# Patient Record
Sex: MALE | Race: WHITE | NOT HISPANIC OR LATINO | URBAN - METROPOLITAN AREA
[De-identification: names, ages, dates, MRNs, and addresses within clinical notes are randomized per-mention and may not be internally consistent; named-entity substitution may affect disease eponyms.]

---

## 2019-02-26 ENCOUNTER — INPATIENT (INPATIENT)
Facility: HOSPITAL | Age: 79
LOS: 1 days | Discharge: HOME | DRG: 340 | End: 2019-02-28
Attending: SURGERY | Admitting: SURGERY
Payer: MEDICARE

## 2019-02-26 ENCOUNTER — TRANSCRIPTION ENCOUNTER (OUTPATIENT)
Age: 79
End: 2019-02-26

## 2019-02-26 VITALS — WEIGHT: 182.98 LBS | HEIGHT: 74 IN

## 2019-02-26 DIAGNOSIS — K35.30 ACUTE APPENDICITIS WITH LOCALIZED PERITONITIS, WITHOUT PERFORATION OR GANGRENE: ICD-10-CM

## 2019-02-26 DIAGNOSIS — E78.5 HYPERLIPIDEMIA, UNSPECIFIED: ICD-10-CM

## 2019-02-26 DIAGNOSIS — I10 ESSENTIAL (PRIMARY) HYPERTENSION: ICD-10-CM

## 2019-02-26 DIAGNOSIS — I71.4 ABDOMINAL AORTIC ANEURYSM, WITHOUT RUPTURE: ICD-10-CM

## 2019-02-26 LAB
ALBUMIN SERPL ELPH-MCNC: 3.3 G/DL — LOW (ref 3.5–5)
ALP SERPL-CCNC: 90 U/L — SIGNIFICANT CHANGE UP (ref 40–120)
ALT FLD-CCNC: 21 U/L DA — SIGNIFICANT CHANGE UP (ref 10–60)
ANION GAP SERPL CALC-SCNC: 8 MMOL/L — SIGNIFICANT CHANGE UP (ref 5–17)
APTT BLD: 37.5 SEC — HIGH (ref 27.5–36.3)
AST SERPL-CCNC: 23 U/L — SIGNIFICANT CHANGE UP (ref 10–40)
BASOPHILS # BLD AUTO: 0.03 K/UL — SIGNIFICANT CHANGE UP (ref 0–0.2)
BASOPHILS NFR BLD AUTO: 0.4 % — SIGNIFICANT CHANGE UP (ref 0–2)
BILIRUB SERPL-MCNC: 0.6 MG/DL — SIGNIFICANT CHANGE UP (ref 0.2–1.2)
BUN SERPL-MCNC: 20 MG/DL — HIGH (ref 7–18)
CALCIUM SERPL-MCNC: 8.9 MG/DL — SIGNIFICANT CHANGE UP (ref 8.4–10.5)
CHLORIDE SERPL-SCNC: 108 MMOL/L — SIGNIFICANT CHANGE UP (ref 96–108)
CK MB BLD-MCNC: 1 % — SIGNIFICANT CHANGE UP (ref 0–3.5)
CK MB CFR SERPL CALC: 1.3 NG/ML — SIGNIFICANT CHANGE UP (ref 0–3.6)
CK SERPL-CCNC: 129 U/L — SIGNIFICANT CHANGE UP (ref 35–232)
CO2 SERPL-SCNC: 23 MMOL/L — SIGNIFICANT CHANGE UP (ref 22–31)
CREAT SERPL-MCNC: 1.18 MG/DL — SIGNIFICANT CHANGE UP (ref 0.5–1.3)
EOSINOPHIL # BLD AUTO: 0.09 K/UL — SIGNIFICANT CHANGE UP (ref 0–0.5)
EOSINOPHIL NFR BLD AUTO: 1.2 % — SIGNIFICANT CHANGE UP (ref 0–6)
GLUCOSE SERPL-MCNC: 84 MG/DL — SIGNIFICANT CHANGE UP (ref 70–99)
HCT VFR BLD CALC: 43.5 % — SIGNIFICANT CHANGE UP (ref 39–50)
HGB BLD-MCNC: 14.3 G/DL — SIGNIFICANT CHANGE UP (ref 13–17)
IMM GRANULOCYTES NFR BLD AUTO: 0.3 % — SIGNIFICANT CHANGE UP (ref 0–1.5)
INR BLD: 1.06 RATIO — SIGNIFICANT CHANGE UP (ref 0.88–1.16)
LYMPHOCYTES # BLD AUTO: 1.45 K/UL — SIGNIFICANT CHANGE UP (ref 1–3.3)
LYMPHOCYTES # BLD AUTO: 19.2 % — SIGNIFICANT CHANGE UP (ref 13–44)
MCHC RBC-ENTMCNC: 29.5 PG — SIGNIFICANT CHANGE UP (ref 27–34)
MCHC RBC-ENTMCNC: 32.9 GM/DL — SIGNIFICANT CHANGE UP (ref 32–36)
MCV RBC AUTO: 89.7 FL — SIGNIFICANT CHANGE UP (ref 80–100)
MONOCYTES # BLD AUTO: 0.73 K/UL — SIGNIFICANT CHANGE UP (ref 0–0.9)
MONOCYTES NFR BLD AUTO: 9.7 % — SIGNIFICANT CHANGE UP (ref 2–14)
NEUTROPHILS # BLD AUTO: 5.24 K/UL — SIGNIFICANT CHANGE UP (ref 1.8–7.4)
NEUTROPHILS NFR BLD AUTO: 69.2 % — SIGNIFICANT CHANGE UP (ref 43–77)
NRBC # BLD: 0 /100 WBCS — SIGNIFICANT CHANGE UP (ref 0–0)
PLATELET # BLD AUTO: 249 K/UL — SIGNIFICANT CHANGE UP (ref 150–400)
POTASSIUM SERPL-MCNC: 4.6 MMOL/L — SIGNIFICANT CHANGE UP (ref 3.5–5.3)
POTASSIUM SERPL-SCNC: 4.6 MMOL/L — SIGNIFICANT CHANGE UP (ref 3.5–5.3)
PROT SERPL-MCNC: 8.2 G/DL — SIGNIFICANT CHANGE UP (ref 6–8.3)
PROTHROM AB SERPL-ACNC: 11.8 SEC — SIGNIFICANT CHANGE UP (ref 10–12.9)
RBC # BLD: 4.85 M/UL — SIGNIFICANT CHANGE UP (ref 4.2–5.8)
RBC # FLD: 12.4 % — SIGNIFICANT CHANGE UP (ref 10.3–14.5)
SODIUM SERPL-SCNC: 139 MMOL/L — SIGNIFICANT CHANGE UP (ref 135–145)
TROPONIN I SERPL-MCNC: <0.015 NG/ML — SIGNIFICANT CHANGE UP (ref 0–0.04)
WBC # BLD: 7.56 K/UL — SIGNIFICANT CHANGE UP (ref 3.8–10.5)
WBC # FLD AUTO: 7.56 K/UL — SIGNIFICANT CHANGE UP (ref 3.8–10.5)

## 2019-02-26 PROCEDURE — 99222 1ST HOSP IP/OBS MODERATE 55: CPT | Mod: 57

## 2019-02-26 PROCEDURE — 71045 X-RAY EXAM CHEST 1 VIEW: CPT | Mod: 26

## 2019-02-26 PROCEDURE — 99285 EMERGENCY DEPT VISIT HI MDM: CPT

## 2019-02-26 PROCEDURE — 99222 1ST HOSP IP/OBS MODERATE 55: CPT

## 2019-02-26 PROCEDURE — 93010 ELECTROCARDIOGRAM REPORT: CPT

## 2019-02-26 RX ORDER — CETIRIZINE HYDROCHLORIDE 10 MG/1
1 TABLET ORAL
Qty: 0 | Refills: 0 | COMMUNITY

## 2019-02-26 RX ORDER — LOSARTAN POTASSIUM 100 MG/1
0 TABLET, FILM COATED ORAL
Qty: 0 | Refills: 0 | COMMUNITY

## 2019-02-26 RX ORDER — CEFOTETAN DISODIUM 1 G
2 VIAL (EA) INJECTION EVERY 12 HOURS
Qty: 0 | Refills: 0 | Status: DISCONTINUED | OUTPATIENT
Start: 2019-02-27 | End: 2019-02-27

## 2019-02-26 RX ORDER — MORPHINE SULFATE 50 MG/1
2 CAPSULE, EXTENDED RELEASE ORAL EVERY 6 HOURS
Qty: 0 | Refills: 0 | Status: DISCONTINUED | OUTPATIENT
Start: 2019-02-26 | End: 2019-02-27

## 2019-02-26 RX ORDER — CEFOTETAN DISODIUM 1 G
VIAL (EA) INJECTION
Qty: 0 | Refills: 0 | Status: DISCONTINUED | OUTPATIENT
Start: 2019-02-26 | End: 2019-02-27

## 2019-02-26 RX ORDER — ONDANSETRON 8 MG/1
4 TABLET, FILM COATED ORAL EVERY 6 HOURS
Qty: 0 | Refills: 0 | Status: DISCONTINUED | OUTPATIENT
Start: 2019-02-26 | End: 2019-02-27

## 2019-02-26 RX ORDER — CEFOTETAN DISODIUM 1 G
2 VIAL (EA) INJECTION ONCE
Qty: 0 | Refills: 0 | Status: COMPLETED | OUTPATIENT
Start: 2019-02-26 | End: 2019-02-26

## 2019-02-26 RX ORDER — HEPARIN SODIUM 5000 [USP'U]/ML
5000 INJECTION INTRAVENOUS; SUBCUTANEOUS EVERY 8 HOURS
Qty: 0 | Refills: 0 | Status: DISCONTINUED | OUTPATIENT
Start: 2019-02-26 | End: 2019-02-27

## 2019-02-26 RX ORDER — SODIUM CHLORIDE 9 MG/ML
1000 INJECTION, SOLUTION INTRAVENOUS
Qty: 0 | Refills: 0 | Status: DISCONTINUED | OUTPATIENT
Start: 2019-02-26 | End: 2019-02-27

## 2019-02-26 RX ADMIN — MORPHINE SULFATE 2 MILLIGRAM(S): 50 CAPSULE, EXTENDED RELEASE ORAL at 18:33

## 2019-02-26 RX ADMIN — HEPARIN SODIUM 5000 UNIT(S): 5000 INJECTION INTRAVENOUS; SUBCUTANEOUS at 23:15

## 2019-02-26 RX ADMIN — MORPHINE SULFATE 2 MILLIGRAM(S): 50 CAPSULE, EXTENDED RELEASE ORAL at 18:05

## 2019-02-26 RX ADMIN — SODIUM CHLORIDE 120 MILLILITER(S): 9 INJECTION, SOLUTION INTRAVENOUS at 18:03

## 2019-02-26 RX ADMIN — ONDANSETRON 4 MILLIGRAM(S): 8 TABLET, FILM COATED ORAL at 18:05

## 2019-02-26 RX ADMIN — MORPHINE SULFATE 2 MILLIGRAM(S): 50 CAPSULE, EXTENDED RELEASE ORAL at 23:16

## 2019-02-26 RX ADMIN — Medication 100 GRAM(S): at 18:32

## 2019-02-26 RX ADMIN — MORPHINE SULFATE 2 MILLIGRAM(S): 50 CAPSULE, EXTENDED RELEASE ORAL at 23:54

## 2019-02-26 NOTE — ED PROVIDER NOTE - CARE PLAN
Principal Discharge DX:	Acute appendicitis with localized peritonitis, without perforation, abscess, or gangrene

## 2019-02-26 NOTE — CONSULT NOTE ADULT - ATTENDING COMMENTS
Seen ex'ed dw'ed PA  Incidental AIA  R LQ pain since fri (better)  No CP, Back pain, LE pain  No previous vasc probs  Active- walks 5 mi regularly.    HTN, Glaucoma, GERD  ASA, Statin  Remote smoking    No h/o GI neha, F/C    FH: Brother w cerebral aneurysm    AA, NAD, LEON's  No JVD/Icterus  Abd: R LQ mod tenderness  Ao nonpalp  Exts: wnl incl palp rad, fem, pop (wide), DP, PT's    OUtside pelvic CT w IV con reviewed: Ch appearing Aortiliac aneurysm.  Prox extent not vis.  Size still relatively small.    A/P:   Incidental AIA  Asymp  Active Appendicitis    Rec:   Procceed w Gen surg mgmt    WIll neeed CTA chest/ABd /Pelvis and poss plans for elective aneurysm repair    Screen direct relatives for aneurysms (DW'ed pt and daughter Rn)    Will fu Seen ex'ed dw'ed PA  Incidental AIA  R LQ pain since fri (better)  No CP, Back pain, LE pain  No previous vasc probs  Active- walks 5 mi regularly.    HTN, Glaucoma, GERD  ASA, Statin  Remote smoking    No h/o GI neha, F/C    FH: Brother w cerebral aneurysm    AA, NAD, LEON's  No JVD/Icterus  Abd: R LQ mod tenderness  Ao nonpalp  Exts: wnl incl palp rad, fem, pop (wide), DP, PT's    OUtside pelvic CT w IV con reviewed: Ch appearing Aortiliac aneurysm.  Prox extent not vis.  Size still relatively small.    A/P:   Incidental AIA  Asymp  Active Appendicitis    Rec:   Procceed w Gen surg mgmt    WIll neeed CTA chest/ABd /Pelvis and poss plans for elective aneurysm repair  Will also need art US of LEs R/O MIRNA's (can be done electively)    Screen direct relatives for aneurysms (DW'ed pt and daughter Rn)    Will fu

## 2019-02-26 NOTE — ED PROVIDER NOTE - CONSTITUTIONAL, MLM
normal... Very well appearing; younger than stated age well nourished, awake, alert, oriented to person, place, time/situation and in no apparent distress.

## 2019-02-26 NOTE — H&P ADULT - PROBLEM SELECTOR PLAN 2
1. Vascular Surgery consult  2. Strict BP control  3. Will continue ASA & statin post-op when he resumes oral regimen

## 2019-02-26 NOTE — H&P ADULT - RS GEN PE MLT RESP DETAILS PC
respirations non-labored/good air movement/breath sounds equal/clear to auscultation bilaterally/no rhonchi/no wheezes/no rales/no chest wall tenderness

## 2019-02-26 NOTE — CONSULT NOTE ADULT - ASSESSMENT
appendicitis planned for lap appy  4.9cm AAA dilated common iliac 3cm    no emergent vascular surgical intervention warranted presently. Pt to proceed with indicated General surgical procedure as needed.  Will need eventual CTA of chest/abd pelvis for full evaluation of Arterial aneurysmal disease.  BP control  observation

## 2019-02-26 NOTE — H&P ADULT - HISTORY OF PRESENT ILLNESS
A 79 yo male with a PMH of HTN and HLD presents to the ED with complaints of abdominal pain x5 days. Patient describes pain as sharp, constant, non-radiating, localized to the RLQ. Patient quantified pain as 12/10 five days ago and currently rates it as 5/10 in the ED without pain management. He states nothing makes it better or worse even while resting. Patient states he was referred by his GI physician for an abdominal CT due to his constant RLQ pain which revealed an acute, uncomplicated appendicitis & he was instructed to come to the ED. Outpatient images were uploaded into our system and reviewed by radiologist who also interpreted the CT as acute, uncomplicated appendicitis with possible phlegmonous changes to the appendiceal tip.    Also found on the CT was a 4.9cm descending aortic aneurysm with extension to the left common iliac artery with mural thrombus formation & a small questionable area of dissection.     Patient last ate yesterday.    Two weeks ago the patient had an EGD for dysphagia & was found to have esophagitis.

## 2019-02-26 NOTE — H&P ADULT - GIT ABD PE PAL DETAILS PC
+McBurney's sign, -Psoas sign, -Obturator sign, -Rovsing sign/rebound tenderness/splenomegaly/tender/guarding

## 2019-02-26 NOTE — H&P ADULT - ASSESSMENT
A 79 yo male with a PMH of HTN & HLD with Acute Uncomplicated Appendicitis &  4.9cm Descending Aortic Aneurysm

## 2019-02-26 NOTE — H&P ADULT - NSHPSOCIALHISTORY_GEN_ALL_CORE
Patient is  and a retired banker. Patient has a hx of smoking (quit 40 years ago but use to smoke 1/2 pack a day). Patient drinks occasionally about once a month and denies illicit drug use.

## 2019-02-26 NOTE — H&P ADULT - PROBLEM SELECTOR PLAN 1
1. Admit to Surgery (Dr. Tiardo)  2. NPO  3. Vitals per routine  4. Ambulation  5. LR @ 120cc/hr  6. Start Cefotetan 2g q 8 hrs  7. SQ Heparin for DVT PPx  8. Zofran as needed for nausea or vomiting  9. Morphine as needed for pain  10. AM Labs  11. Discussed with Dr. Tirado & he agrees 1. Admit to Surgery (Dr. Tirado)  2. NPO  3. Vitals per routine  4. Ambulation  5. LR @ 120cc/hr  6. Start Cefotetan 2g q 12 hrs  7. SQ Heparin for DVT PPx  8. Zofran as needed for nausea or vomiting  9. Morphine as needed for pain  10. AM Labs  11. Discussed with Dr. Tirado & he agrees

## 2019-02-26 NOTE — ED PROVIDER NOTE - OBJECTIVE STATEMENT
77 y/o M pt with a PMHx of HTN and HLD and no significant PSHx sent in my PMD for RLQ pain for the last couple of days. Pt notes accompanying nausea and subjective fever as well. Pt states that he was called back by his PMD for a CT read positive for appendicitis. Pt last meal and last ASA was yesterday. Pt afebrile in ED. Pt denies vomiting, diarrhea, or any other acute complaints. NKDA.

## 2019-02-26 NOTE — H&P ADULT - ATTENDING COMMENTS
pt with a/c appendicitis and also has aortic aneurysm. Had a long d/w the pt and the family  Needs laparoscopic appendectomy possible open  All the options, benefits and risks of the surgery were discussed  Family was also with him  Informed consent obtained

## 2019-02-26 NOTE — H&P ADULT - PROBLEM SELECTOR PLAN 4
1. Hold PO BP meds prior to surgery  2. If needed, will control BP with IV Enalapril  3. Resume oral regimen post-op

## 2019-02-26 NOTE — CONSULT NOTE ADULT - SUBJECTIVE AND OBJECTIVE BOX
79 yo male pmhx hTN, to er c/o abd pain localized to rlq since friday, dx with appendicitis planned for appendectemy, noted to have incidental finding of AAA on CT.  Pt states that no hx of Aneurysm prior.   No c/o lower extremity pain, or claudication. Non smoker. no cp or sob.  No back pain,      ICU Vital Signs Last 24 Hrs  T(C): 37.2 (26 Feb 2019 15:13), Max: 37.2 (26 Feb 2019 15:13)  T(F): 99 (26 Feb 2019 15:13), Max: 99 (26 Feb 2019 15:13)  HR: 79 (26 Feb 2019 15:13) (75 - 79)  BP: 135/79 (26 Feb 2019 15:13) (135/79 - 144/80)  BP(mean): --  ABP: --  ABP(mean): --  RR: 16 (26 Feb 2019 15:13) (16 - 16)  SpO2: 98% (26 Feb 2019 15:13) (97% - 98%)    A&Ox3 nad  Abd: soft =rlq tenderness at mcburdies point.  no palpable masses, no thrill. no bruits  Extremities warm bilat  fem pulses 2+ bilat  distal extremity 2+ bilat  lungs; cta bilat no wrr                            14.3   7.56  )-----------( 249      ( 26 Feb 2019 16:00 )             43.5     02-26    139  |  108  |  20<H>  ----------------------------<  84  4.6   |  23  |  1.18    Ca    8.9      26 Feb 2019 16:00    TPro  8.2  /  Alb  3.3<L>  /  TBili  0.6  /  DBili  x   /  AST  23  /  ALT  21  /  AlkPhos  90  02-26      CT (image loaded to pacs) reviewed with Dr calzada. 4.9cm infrarenal aaa extending to left iliac with a mural thrombus

## 2019-02-27 ENCOUNTER — RESULT REVIEW (OUTPATIENT)
Age: 79
End: 2019-02-27

## 2019-02-27 LAB
ABO RH CONFIRMATION: SIGNIFICANT CHANGE UP
ANION GAP SERPL CALC-SCNC: 6 MMOL/L — SIGNIFICANT CHANGE UP (ref 5–17)
BUN SERPL-MCNC: 21 MG/DL — HIGH (ref 7–18)
CALCIUM SERPL-MCNC: 8.6 MG/DL — SIGNIFICANT CHANGE UP (ref 8.4–10.5)
CHLORIDE SERPL-SCNC: 109 MMOL/L — HIGH (ref 96–108)
CO2 SERPL-SCNC: 24 MMOL/L — SIGNIFICANT CHANGE UP (ref 22–31)
CREAT SERPL-MCNC: 1.38 MG/DL — HIGH (ref 0.5–1.3)
GLUCOSE SERPL-MCNC: 81 MG/DL — SIGNIFICANT CHANGE UP (ref 70–99)
HCT VFR BLD CALC: 37.6 % — LOW (ref 39–50)
HGB BLD-MCNC: 12.7 G/DL — LOW (ref 13–17)
MCHC RBC-ENTMCNC: 30.5 PG — SIGNIFICANT CHANGE UP (ref 27–34)
MCHC RBC-ENTMCNC: 33.8 GM/DL — SIGNIFICANT CHANGE UP (ref 32–36)
MCV RBC AUTO: 90.2 FL — SIGNIFICANT CHANGE UP (ref 80–100)
NRBC # BLD: 0 /100 WBCS — SIGNIFICANT CHANGE UP (ref 0–0)
PLATELET # BLD AUTO: 240 K/UL — SIGNIFICANT CHANGE UP (ref 150–400)
POTASSIUM SERPL-MCNC: 4.8 MMOL/L — SIGNIFICANT CHANGE UP (ref 3.5–5.3)
POTASSIUM SERPL-SCNC: 4.8 MMOL/L — SIGNIFICANT CHANGE UP (ref 3.5–5.3)
RBC # BLD: 4.17 M/UL — LOW (ref 4.2–5.8)
RBC # FLD: 12.5 % — SIGNIFICANT CHANGE UP (ref 10.3–14.5)
SODIUM SERPL-SCNC: 139 MMOL/L — SIGNIFICANT CHANGE UP (ref 135–145)
WBC # BLD: 7.47 K/UL — SIGNIFICANT CHANGE UP (ref 3.8–10.5)
WBC # FLD AUTO: 7.47 K/UL — SIGNIFICANT CHANGE UP (ref 3.8–10.5)

## 2019-02-27 PROCEDURE — 88304 TISSUE EXAM BY PATHOLOGIST: CPT | Mod: 26

## 2019-02-27 PROCEDURE — 44970 LAPAROSCOPY APPENDECTOMY: CPT

## 2019-02-27 RX ORDER — CEFOTETAN DISODIUM 1 G
2 VIAL (EA) INJECTION EVERY 12 HOURS
Qty: 0 | Refills: 0 | Status: DISCONTINUED | OUTPATIENT
Start: 2019-02-27 | End: 2019-02-28

## 2019-02-27 RX ORDER — SODIUM CHLORIDE 9 MG/ML
1000 INJECTION, SOLUTION INTRAVENOUS
Qty: 0 | Refills: 0 | Status: DISCONTINUED | OUTPATIENT
Start: 2019-02-27 | End: 2019-02-27

## 2019-02-27 RX ORDER — OXYCODONE AND ACETAMINOPHEN 5; 325 MG/1; MG/1
1 TABLET ORAL EVERY 4 HOURS
Qty: 0 | Refills: 0 | Status: DISCONTINUED | OUTPATIENT
Start: 2019-02-27 | End: 2019-02-28

## 2019-02-27 RX ORDER — HYDROMORPHONE HYDROCHLORIDE 2 MG/ML
0.5 INJECTION INTRAMUSCULAR; INTRAVENOUS; SUBCUTANEOUS
Qty: 0 | Refills: 0 | Status: DISCONTINUED | OUTPATIENT
Start: 2019-02-27 | End: 2019-02-27

## 2019-02-27 RX ADMIN — Medication 100 GRAM(S): at 05:15

## 2019-02-27 RX ADMIN — Medication 100 GRAM(S): at 17:40

## 2019-02-27 RX ADMIN — HEPARIN SODIUM 5000 UNIT(S): 5000 INJECTION INTRAVENOUS; SUBCUTANEOUS at 05:16

## 2019-02-27 RX ADMIN — SODIUM CHLORIDE 120 MILLILITER(S): 9 INJECTION, SOLUTION INTRAVENOUS at 05:16

## 2019-02-27 NOTE — BRIEF OPERATIVE NOTE - PROCEDURE
<<-----Click on this checkbox to enter Procedure Appendectomy, laparoscopic, age older than 10 years  02/27/2019    Active  SEFERINO

## 2019-02-27 NOTE — PATIENT PROFILE ADULT - DO YOU FEEL LIKE HURTING OTHERS?
Problem: Safety  Goal: Will remain free from injury  Outcome: PROGRESSING AS EXPECTED  Bed locked and in lowest position. Call light and personal belongings in reach. Hourly rounding.     Problem: Pain Management  Goal: Pain level will decrease to patient's comfort goal  Outcome: PROGRESSING AS EXPECTED  Pt denies need for PRN pain medication at this time. Will continue to monitor         no

## 2019-02-27 NOTE — PROGRESS NOTE ADULT - SUBJECTIVE AND OBJECTIVE BOX
Patient seen and examined at bedside. Resting comfortably in NAD. States pain is well controlled. Denies N/V. Had loose BM this morning. Hemodynamically stable and afebrile. Cleared by vascular surgery for appendectomy. On IV ABx.    Vital Signs Last 24 Hrs  T(C): 36.9 (27 Feb 2019 05:43), Max: 37.2 (26 Feb 2019 15:13)  T(F): 98.4 (27 Feb 2019 05:43), Max: 99 (26 Feb 2019 15:13)  HR: 83 (27 Feb 2019 05:43) (75 - 85)  BP: 127/71 (27 Feb 2019 05:43) (127/71 - 144/80)  BP(mean): --  RR: 16 (27 Feb 2019 05:43) (16 - 16)  SpO2: 97% (27 Feb 2019 05:43) (97% - 99%)                          12.7   7.47  )-----------( 240      ( 27 Feb 2019 10:10 )             37.6   02-27    139  |  109<H>  |  21<H>  ----------------------------<  81  4.8   |  24  |  1.38<H>    Ca    8.6      27 Feb 2019 10:10    TPro  8.2  /  Alb  3.3<L>  /  TBili  0.6  /  DBili  x   /  AST  23  /  ALT  21  /  AlkPhos  90  02-26    General: AAOx3, resting in bed, in NAD  Resp: Reg resp effort on room air  Abd: Soft, ND, mild TTP in RLQ, ro rigidity or guarding    Assessment:   78 M with acute appendicitis    Plan:  -NPO  -Lap Appy today  -Continue Abx  -Monitor vitals  -Pain control as needed

## 2019-02-27 NOTE — PROGRESS NOTE ADULT - SUBJECTIVE AND OBJECTIVE BOX
Post op Check    Patient seen and examined at bedside. Resting comfortably in bed, in NAD. States he ate dinner, and tolerated well, no N/V. Denies flatus/BM yet. Hemodynamically stable, afebrile. Drain with minimal SS output. Pain well controlled. Remains on IV ABx    General: AAOx3, NAD  Resp: Reg resp effort on room air  Abd: Soft, ND, mild appropriate TTP around incision sites    Assessment:  78 M POD 0 from lap appendectomy    Plan:  -Monitor vitals  -Continue ABx  -Continue diet  -Heparin prophylaxis tomorrow  -Pain control as needed

## 2019-02-28 ENCOUNTER — TRANSCRIPTION ENCOUNTER (OUTPATIENT)
Age: 79
End: 2019-02-28

## 2019-02-28 VITALS
DIASTOLIC BLOOD PRESSURE: 88 MMHG | SYSTOLIC BLOOD PRESSURE: 170 MMHG | OXYGEN SATURATION: 100 % | HEART RATE: 84 BPM | RESPIRATION RATE: 17 BRPM | TEMPERATURE: 98 F

## 2019-02-28 LAB
ANION GAP SERPL CALC-SCNC: 10 MMOL/L — SIGNIFICANT CHANGE UP (ref 5–17)
BUN SERPL-MCNC: 23 MG/DL — HIGH (ref 7–18)
CALCIUM SERPL-MCNC: 8.3 MG/DL — LOW (ref 8.4–10.5)
CHLORIDE SERPL-SCNC: 107 MMOL/L — SIGNIFICANT CHANGE UP (ref 96–108)
CO2 SERPL-SCNC: 22 MMOL/L — SIGNIFICANT CHANGE UP (ref 22–31)
CREAT SERPL-MCNC: 1.37 MG/DL — HIGH (ref 0.5–1.3)
GLUCOSE SERPL-MCNC: 117 MG/DL — HIGH (ref 70–99)
GRAM STN FLD: SIGNIFICANT CHANGE UP
HCT VFR BLD CALC: 38 % — LOW (ref 39–50)
HGB BLD-MCNC: 12.5 G/DL — LOW (ref 13–17)
MCHC RBC-ENTMCNC: 29.4 PG — SIGNIFICANT CHANGE UP (ref 27–34)
MCHC RBC-ENTMCNC: 32.9 GM/DL — SIGNIFICANT CHANGE UP (ref 32–36)
MCV RBC AUTO: 89.4 FL — SIGNIFICANT CHANGE UP (ref 80–100)
NRBC # BLD: 0 /100 WBCS — SIGNIFICANT CHANGE UP (ref 0–0)
PLATELET # BLD AUTO: 247 K/UL — SIGNIFICANT CHANGE UP (ref 150–400)
POTASSIUM SERPL-MCNC: 4.4 MMOL/L — SIGNIFICANT CHANGE UP (ref 3.5–5.3)
POTASSIUM SERPL-SCNC: 4.4 MMOL/L — SIGNIFICANT CHANGE UP (ref 3.5–5.3)
RBC # BLD: 4.25 M/UL — SIGNIFICANT CHANGE UP (ref 4.2–5.8)
RBC # FLD: 12.1 % — SIGNIFICANT CHANGE UP (ref 10.3–14.5)
SODIUM SERPL-SCNC: 139 MMOL/L — SIGNIFICANT CHANGE UP (ref 135–145)
SPECIMEN SOURCE: SIGNIFICANT CHANGE UP
WBC # BLD: 12.2 K/UL — HIGH (ref 3.8–10.5)
WBC # FLD AUTO: 12.2 K/UL — HIGH (ref 3.8–10.5)

## 2019-02-28 PROCEDURE — 87075 CULTR BACTERIA EXCEPT BLOOD: CPT

## 2019-02-28 PROCEDURE — 82550 ASSAY OF CK (CPK): CPT

## 2019-02-28 PROCEDURE — 74174 CTA ABD&PLVS W/CONTRAST: CPT

## 2019-02-28 PROCEDURE — 87070 CULTURE OTHR SPECIMN AEROBIC: CPT

## 2019-02-28 PROCEDURE — 36415 COLL VENOUS BLD VENIPUNCTURE: CPT

## 2019-02-28 PROCEDURE — 85610 PROTHROMBIN TIME: CPT

## 2019-02-28 PROCEDURE — 88304 TISSUE EXAM BY PATHOLOGIST: CPT

## 2019-02-28 PROCEDURE — 85730 THROMBOPLASTIN TIME PARTIAL: CPT

## 2019-02-28 PROCEDURE — 71275 CT ANGIOGRAPHY CHEST: CPT | Mod: 26

## 2019-02-28 PROCEDURE — 80048 BASIC METABOLIC PNL TOTAL CA: CPT

## 2019-02-28 PROCEDURE — 86900 BLOOD TYPING SEROLOGIC ABO: CPT

## 2019-02-28 PROCEDURE — 82553 CREATINE MB FRACTION: CPT

## 2019-02-28 PROCEDURE — 71275 CT ANGIOGRAPHY CHEST: CPT

## 2019-02-28 PROCEDURE — 85027 COMPLETE CBC AUTOMATED: CPT

## 2019-02-28 PROCEDURE — 71045 X-RAY EXAM CHEST 1 VIEW: CPT

## 2019-02-28 PROCEDURE — 87205 SMEAR GRAM STAIN: CPT

## 2019-02-28 PROCEDURE — 74174 CTA ABD&PLVS W/CONTRAST: CPT | Mod: 26

## 2019-02-28 PROCEDURE — 93005 ELECTROCARDIOGRAM TRACING: CPT

## 2019-02-28 PROCEDURE — 84484 ASSAY OF TROPONIN QUANT: CPT

## 2019-02-28 PROCEDURE — 80053 COMPREHEN METABOLIC PANEL: CPT

## 2019-02-28 PROCEDURE — 86901 BLOOD TYPING SEROLOGIC RH(D): CPT

## 2019-02-28 PROCEDURE — 86850 RBC ANTIBODY SCREEN: CPT

## 2019-02-28 PROCEDURE — 99285 EMERGENCY DEPT VISIT HI MDM: CPT | Mod: 25

## 2019-02-28 RX ORDER — MOXIFLOXACIN HYDROCHLORIDE TABLETS, 400 MG 400 MG/1
1 TABLET, FILM COATED ORAL
Qty: 12 | Refills: 0
Start: 2019-02-28 | End: 2019-03-05

## 2019-02-28 RX ORDER — LOSARTAN POTASSIUM 100 MG/1
0 TABLET, FILM COATED ORAL
Qty: 0 | Refills: 0 | COMMUNITY

## 2019-02-28 RX ORDER — HEPARIN SODIUM 5000 [USP'U]/ML
5000 INJECTION INTRAVENOUS; SUBCUTANEOUS EVERY 8 HOURS
Qty: 0 | Refills: 0 | Status: DISCONTINUED | OUTPATIENT
Start: 2019-02-28 | End: 2019-02-28

## 2019-02-28 RX ORDER — METRONIDAZOLE 500 MG
1 TABLET ORAL
Qty: 18 | Refills: 0
Start: 2019-02-28 | End: 2019-03-05

## 2019-02-28 RX ORDER — ASPIRIN/CALCIUM CARB/MAGNESIUM 324 MG
81 TABLET ORAL DAILY
Qty: 0 | Refills: 0 | Status: DISCONTINUED | OUTPATIENT
Start: 2019-02-28 | End: 2019-02-28

## 2019-02-28 RX ADMIN — Medication 100 GRAM(S): at 05:24

## 2019-02-28 RX ADMIN — Medication 81 MILLIGRAM(S): at 12:00

## 2019-02-28 RX ADMIN — OXYCODONE AND ACETAMINOPHEN 1 TABLET(S): 5; 325 TABLET ORAL at 05:29

## 2019-02-28 RX ADMIN — OXYCODONE AND ACETAMINOPHEN 1 TABLET(S): 5; 325 TABLET ORAL at 06:30

## 2019-02-28 RX ADMIN — HEPARIN SODIUM 5000 UNIT(S): 5000 INJECTION INTRAVENOUS; SUBCUTANEOUS at 12:00

## 2019-02-28 NOTE — DISCHARGE NOTE PROVIDER - NSDCCPCAREPLAN_GEN_ALL_CORE_FT
PRINCIPAL DISCHARGE DIAGNOSIS  Problem: Acute appendicitis with localized peritonitis, without perforation, abscess, or gangrene  Assessment and Plan of Treatment: Continue antibiotics until complete PRINCIPAL DISCHARGE DIAGNOSIS  Problem: Acute appendicitis with localized peritonitis, without perforation, abscess, or gangrene  Assessment and Plan of Treatment: Continue antibiotics until complete. You will be discharged with BUBBA drains. You will need to empty them and record outputs accurately. This will be taught to you by the nursing staff. Please do not remove the BUBBA drains. They will be removed in the office. Please bring to the office accurate records of output.   No heavy lifting or straining      SECONDARY DISCHARGE DIAGNOSES  Problem: Abdominal aortic aneurysm (AAA) 3.0 cm to 5.5 cm in diameter in male  Assessment and Plan of Treatment: Abdominal aortic aneurysm (AAA) 3.0 cm to 5.5 cm in diameter in male please follow up with Dr. Camp within 2 weeks

## 2019-02-28 NOTE — DISCHARGE NOTE PROVIDER - CARE PROVIDER_API CALL
Denton Tirado (MD)  Surgery  [address] [place], [place] [id number]  Phone: [phone]  Fax: [phone]  Follow Up Time: Denton Tirado (MD)  Surgery  [address] [place] [id number]  Phone: [phone]  Fax: [phone]  Follow Up Time:     Waylon Cmap)  Vascular Surgery  [address] [facility], [address] [place] [address]  Phone: [phone]  Fax: [phone]  Follow Up Time:

## 2019-02-28 NOTE — PROGRESS NOTE ADULT - SUBJECTIVE AND OBJECTIVE BOX
Patient seen and examined at bedside. POD 1 from lap appendectomy. Resting comfortably in bed, in NAD. Hemodynamically stable and afebrile. Tolerating regular diet, no nausea/vomiting. Passed flatus, denies BM yet. Remains on IV ABx.     Vital Signs Last 24 Hrs  T(C): 36.9 (28 Feb 2019 05:37), Max: 37 (27 Feb 2019 13:31)  T(F): 98.5 (28 Feb 2019 05:37), Max: 98.6 (27 Feb 2019 13:31)  HR: 75 (28 Feb 2019 05:37) (74 - 84)  BP: 145/76 (28 Feb 2019 05:37) (118/60 - 153/80)  BP(mean): 88 (27 Feb 2019 15:35) (73 - 98)  RR: 16 (28 Feb 2019 05:37) (15 - 20)  SpO2: 99% (28 Feb 2019 05:37) (93% - 99%)                        12.5   12.20 )-----------( 247      ( 28 Feb 2019 05:41 )             38.0     General: AAOx3, resting in bed in NAD  Resp: Reg resp effort on room air  Abd: Soft, ND, minimal TTP in RLQ. Drain in place with minimal SS output    Drain: 30 mL SS    Assessment:  78 M POD 1 s/p laparoscopic appendectomy    Plan:  -Continue diet  -Continue Abx  -Monitor vitals  -Drain training  -DC home today

## 2019-02-28 NOTE — DISCHARGE NOTE NURSING/CASE MANAGEMENT/SOCIAL WORK - NSDCPNDISPN_GEN_ALL_CORE
Safe use, storage and disposal of opioids when prescribed/Education provided on the pain management plan of care/Activities of daily living, including home environment that might     exacerbate pain or reduce effectiveness of the pain management plan of care as well as strategies to address these issues/Side effects of pain management treatment

## 2019-02-28 NOTE — DISCHARGE NOTE NURSING/CASE MANAGEMENT/SOCIAL WORK - NSDCPNINST_GEN_ALL_CORE
Pt AOx3 breathing unlabored no c/o resp. distress chest pain or SOB. Pt S/P Lap Appy, abdomen noted with scope sites x3 with steri-strips and band aids CDI abdomen soft tender to touch rounded in size BS present in all 4 quadrants pt tolerating diet denies any N/V. Pt for DC today post CT- Angio for evaluation of aneurysm verbalizes understanding and agreement with plans Pt for DC home with BUBBA x1 draining serosanguineous drainage teach performed on BUBBA care teach-back performed. Cap refill less than 3 seconds pulses present in all extremities Pt with positive sensation and mobility OOB ambulating voiding without any difficulties. Vital signs stable no distress noted All needs of pt met thus far. Will continue to monitor pt status.

## 2019-02-28 NOTE — DISCHARGE NOTE NURSING/CASE MANAGEMENT/SOCIAL WORK - NSDCDPATPORTLINK_GEN_ALL_CORE
You can access the [facility] Patient Portal, offered by [facility], by registering with the following website: [email or web address]

## 2019-02-28 NOTE — DISCHARGE NOTE PROVIDER - NSDCFUADDINST_GEN_ALL_CORE_FT
Leave steristrips in place. OK to shower. Do not submerge under water for one week. Cover drain site with dry gauze and tape. Do not lift above 10 pounds until cleared by surgeon. Record drain outputs as instructed.   Please call to confirm office appointment on 3/5/19 Leave steristrips in place. OK to shower. Do not submerge under water for one week. Cover drain site with dry gauze and tape. Do not lift above 10 pounds until cleared by surgeon. Record drain outputs as instructed.   Please call to confirm office appointment on 3/5/19 with Dr Tirado  Please call to schedule office visit with Dr. Camp

## 2019-02-28 NOTE — DISCHARGE NOTE PROVIDER - CARE PROVIDERS DIRECT ADDRESSES
[email or web address]

## 2019-02-28 NOTE — DISCHARGE NOTE PROVIDER - HOSPITAL COURSE
Patient admitted with acute appendicitis. Started on IV ABx, taken to OR for lap appendectomy. Drain left in place. Post operatively, patient advanced to regular diet, and tolerated without nausea or vomiting. Ambulatory, voiding appropriately, and passing flatus. Deemed stable for DC home with PO ABx and drain training, with follow up next week in office.

## 2019-03-01 PROBLEM — Z00.00 ENCOUNTER FOR PREVENTIVE HEALTH EXAMINATION: Status: ACTIVE | Noted: 2019-03-01

## 2019-03-01 PROBLEM — I10 ESSENTIAL (PRIMARY) HYPERTENSION: Chronic | Status: ACTIVE | Noted: 2019-02-26

## 2019-03-01 PROBLEM — E78.5 HYPERLIPIDEMIA, UNSPECIFIED: Chronic | Status: ACTIVE | Noted: 2019-02-26

## 2019-03-04 LAB
CULTURE RESULTS: SIGNIFICANT CHANGE UP
CULTURE RESULTS: SIGNIFICANT CHANGE UP
SPECIMEN SOURCE: SIGNIFICANT CHANGE UP
SURGICAL PATHOLOGY STUDY: SIGNIFICANT CHANGE UP

## 2019-03-07 ENCOUNTER — APPOINTMENT (OUTPATIENT)
Dept: SURGERY | Facility: CLINIC | Age: 79
End: 2019-03-07
Payer: MEDICARE

## 2019-03-07 PROCEDURE — 99024 POSTOP FOLLOW-UP VISIT: CPT

## 2019-03-07 NOTE — HISTORY OF PRESENT ILLNESS
[de-identified] : 78 y.o M admitted to the hospital with acute appendicitis 02/26/19-02/28/19. Patient s/p laparoscopic appendectomy\par and drain left in place. He was discharged with PO abx. Patient presents for a post op visit in the office. \par Patient reports he is doing well, he does not offer any complaints. Patient reports he is feeling much better since the surgery, he denies abdominal pain.

## 2019-03-07 NOTE — REASON FOR VISIT
[Post Op: _________] : a [unfilled] post op visit [FreeTextEntry1] : acute appendicitis with appendiceal abscess

## 2019-03-07 NOTE — PLAN
[FreeTextEntry1] : -patient is doing well, surgical site healing without evidence of infection\par -drain removed; dressing placed \par - Warning signs, follow up, and restrictions were discussed with the patient. \par - F/U prn or if there are any problems \par He also has a AAA and he is going to f/u with Dr. Camp

## 2019-03-07 NOTE — ASSESSMENT
[FreeTextEntry1] : 78 y.o M who presented to the hospital with acute appendicitis, s/p appendectomy 02/27/19.

## 2019-03-08 NOTE — CHART NOTE - NSCHARTNOTEFT_GEN_A_CORE
as per medical records request  Addendum  He had a/c appendicitis  with small walled off abscess and had localized peritonitis

## 2019-03-18 ENCOUNTER — APPOINTMENT (OUTPATIENT)
Dept: VASCULAR SURGERY | Facility: CLINIC | Age: 79
End: 2019-03-18
Payer: MEDICARE

## 2019-03-18 VITALS
BODY MASS INDEX: 22.46 KG/M2 | SYSTOLIC BLOOD PRESSURE: 120 MMHG | WEIGHT: 175 LBS | HEART RATE: 62 BPM | DIASTOLIC BLOOD PRESSURE: 73 MMHG | OXYGEN SATURATION: 99 % | HEIGHT: 74 IN | TEMPERATURE: 98.4 F

## 2019-03-18 PROCEDURE — 93925 LOWER EXTREMITY STUDY: CPT

## 2019-03-18 PROCEDURE — 99213 OFFICE O/P EST LOW 20 MIN: CPT

## 2019-05-14 ENCOUNTER — APPOINTMENT (OUTPATIENT)
Dept: VASCULAR SURGERY | Facility: CLINIC | Age: 79
End: 2019-05-14

## 2019-06-24 ENCOUNTER — APPOINTMENT (OUTPATIENT)
Dept: VASCULAR SURGERY | Facility: CLINIC | Age: 79
End: 2019-06-24
Payer: MEDICARE

## 2019-06-24 VITALS
TEMPERATURE: 97.5 F | HEART RATE: 65 BPM | HEIGHT: 74 IN | SYSTOLIC BLOOD PRESSURE: 122 MMHG | BODY MASS INDEX: 23.1 KG/M2 | WEIGHT: 180 LBS | DIASTOLIC BLOOD PRESSURE: 75 MMHG

## 2019-06-24 PROCEDURE — 99212 OFFICE O/P EST SF 10 MIN: CPT

## 2019-06-24 PROCEDURE — 93978 VASCULAR STUDY: CPT

## 2019-08-26 ENCOUNTER — APPOINTMENT (OUTPATIENT)
Dept: VASCULAR SURGERY | Facility: CLINIC | Age: 79
End: 2019-08-26
Payer: MEDICARE

## 2019-08-26 VITALS
TEMPERATURE: 100.1 F | SYSTOLIC BLOOD PRESSURE: 132 MMHG | WEIGHT: 180 LBS | DIASTOLIC BLOOD PRESSURE: 74 MMHG | HEIGHT: 74 IN | HEART RATE: 81 BPM | BODY MASS INDEX: 23.1 KG/M2

## 2019-08-26 PROCEDURE — 99212 OFFICE O/P EST SF 10 MIN: CPT

## 2019-09-30 ENCOUNTER — APPOINTMENT (OUTPATIENT)
Dept: VASCULAR SURGERY | Facility: CLINIC | Age: 79
End: 2019-09-30
Payer: MEDICARE

## 2019-09-30 VITALS
DIASTOLIC BLOOD PRESSURE: 82 MMHG | HEART RATE: 65 BPM | OXYGEN SATURATION: 97 % | SYSTOLIC BLOOD PRESSURE: 160 MMHG | HEIGHT: 74 IN | TEMPERATURE: 99 F | BODY MASS INDEX: 22.59 KG/M2 | WEIGHT: 176 LBS

## 2019-09-30 PROCEDURE — 99212 OFFICE O/P EST SF 10 MIN: CPT

## 2019-10-16 ENCOUNTER — OUTPATIENT (OUTPATIENT)
Dept: OUTPATIENT SERVICES | Facility: HOSPITAL | Age: 79
LOS: 1 days | End: 2019-10-16
Payer: MEDICARE

## 2019-10-16 VITALS
DIASTOLIC BLOOD PRESSURE: 90 MMHG | SYSTOLIC BLOOD PRESSURE: 140 MMHG | HEART RATE: 73 BPM | RESPIRATION RATE: 16 BRPM | WEIGHT: 182.1 LBS | TEMPERATURE: 99 F | OXYGEN SATURATION: 95 % | HEIGHT: 72 IN

## 2019-10-16 DIAGNOSIS — I72.3 ANEURYSM OF ILIAC ARTERY: ICD-10-CM

## 2019-10-16 DIAGNOSIS — Z98.890 OTHER SPECIFIED POSTPROCEDURAL STATES: Chronic | ICD-10-CM

## 2019-10-16 DIAGNOSIS — Z90.49 ACQUIRED ABSENCE OF OTHER SPECIFIED PARTS OF DIGESTIVE TRACT: Chronic | ICD-10-CM

## 2019-10-16 LAB
ANION GAP SERPL CALC-SCNC: 14 MMO/L — SIGNIFICANT CHANGE UP (ref 7–14)
BASOPHILS # BLD AUTO: 0.03 K/UL — SIGNIFICANT CHANGE UP (ref 0–0.2)
BASOPHILS NFR BLD AUTO: 0.3 % — SIGNIFICANT CHANGE UP (ref 0–2)
BLD GP AB SCN SERPL QL: NEGATIVE — SIGNIFICANT CHANGE UP
BUN SERPL-MCNC: 22 MG/DL — SIGNIFICANT CHANGE UP (ref 7–23)
CALCIUM SERPL-MCNC: 9.5 MG/DL — SIGNIFICANT CHANGE UP (ref 8.4–10.5)
CHLORIDE SERPL-SCNC: 101 MMOL/L — SIGNIFICANT CHANGE UP (ref 98–107)
CO2 SERPL-SCNC: 23 MMOL/L — SIGNIFICANT CHANGE UP (ref 22–31)
CREAT SERPL-MCNC: 1.14 MG/DL — SIGNIFICANT CHANGE UP (ref 0.5–1.3)
EOSINOPHIL # BLD AUTO: 0.11 K/UL — SIGNIFICANT CHANGE UP (ref 0–0.5)
EOSINOPHIL NFR BLD AUTO: 1.2 % — SIGNIFICANT CHANGE UP (ref 0–6)
GLUCOSE SERPL-MCNC: 80 MG/DL — SIGNIFICANT CHANGE UP (ref 70–99)
HCT VFR BLD CALC: 39.4 % — SIGNIFICANT CHANGE UP (ref 39–50)
HGB BLD-MCNC: 12.4 G/DL — LOW (ref 13–17)
IMM GRANULOCYTES NFR BLD AUTO: 0.3 % — SIGNIFICANT CHANGE UP (ref 0–1.5)
LYMPHOCYTES # BLD AUTO: 1.85 K/UL — SIGNIFICANT CHANGE UP (ref 1–3.3)
LYMPHOCYTES # BLD AUTO: 20.3 % — SIGNIFICANT CHANGE UP (ref 13–44)
MCHC RBC-ENTMCNC: 28.1 PG — SIGNIFICANT CHANGE UP (ref 27–34)
MCHC RBC-ENTMCNC: 31.5 % — LOW (ref 32–36)
MCV RBC AUTO: 89.3 FL — SIGNIFICANT CHANGE UP (ref 80–100)
MONOCYTES # BLD AUTO: 0.86 K/UL — SIGNIFICANT CHANGE UP (ref 0–0.9)
MONOCYTES NFR BLD AUTO: 9.4 % — SIGNIFICANT CHANGE UP (ref 2–14)
NEUTROPHILS # BLD AUTO: 6.24 K/UL — SIGNIFICANT CHANGE UP (ref 1.8–7.4)
NEUTROPHILS NFR BLD AUTO: 68.5 % — SIGNIFICANT CHANGE UP (ref 43–77)
NRBC # FLD: 0 K/UL — SIGNIFICANT CHANGE UP (ref 0–0)
PLATELET # BLD AUTO: 224 K/UL — SIGNIFICANT CHANGE UP (ref 150–400)
PMV BLD: 10.9 FL — SIGNIFICANT CHANGE UP (ref 7–13)
POTASSIUM SERPL-MCNC: 4.7 MMOL/L — SIGNIFICANT CHANGE UP (ref 3.5–5.3)
POTASSIUM SERPL-SCNC: 4.7 MMOL/L — SIGNIFICANT CHANGE UP (ref 3.5–5.3)
RBC # BLD: 4.41 M/UL — SIGNIFICANT CHANGE UP (ref 4.2–5.8)
RBC # FLD: 14.2 % — SIGNIFICANT CHANGE UP (ref 10.3–14.5)
RH IG SCN BLD-IMP: POSITIVE — SIGNIFICANT CHANGE UP
SODIUM SERPL-SCNC: 138 MMOL/L — SIGNIFICANT CHANGE UP (ref 135–145)
WBC # BLD: 9.12 K/UL — SIGNIFICANT CHANGE UP (ref 3.8–10.5)
WBC # FLD AUTO: 9.12 K/UL — SIGNIFICANT CHANGE UP (ref 3.8–10.5)

## 2019-10-16 PROCEDURE — 93010 ELECTROCARDIOGRAM REPORT: CPT

## 2019-10-16 RX ORDER — LOSARTAN POTASSIUM 100 MG/1
1 TABLET, FILM COATED ORAL
Qty: 0 | Refills: 0 | DISCHARGE

## 2019-10-16 RX ORDER — RANITIDINE HYDROCHLORIDE 150 MG/1
10 TABLET, FILM COATED ORAL
Qty: 0 | Refills: 0 | DISCHARGE

## 2019-10-16 RX ORDER — ASPIRIN/CALCIUM CARB/MAGNESIUM 324 MG
1 TABLET ORAL
Qty: 0 | Refills: 0 | DISCHARGE

## 2019-10-16 RX ORDER — SIMVASTATIN 20 MG/1
0 TABLET, FILM COATED ORAL
Qty: 0 | Refills: 0 | DISCHARGE

## 2019-10-16 RX ORDER — SODIUM CHLORIDE 9 MG/ML
1000 INJECTION, SOLUTION INTRAVENOUS
Refills: 0 | Status: DISCONTINUED | OUTPATIENT
Start: 2019-10-31 | End: 2019-11-01

## 2019-10-16 NOTE — H&P PST ADULT - NSANTHOSAYNRD_GEN_A_CORE
No. MELIA screening performed.  STOP BANG Legend: 0-2 = LOW Risk; 3-4 = INTERMEDIATE Risk; 5-8 = HIGH Risk

## 2019-10-16 NOTE — H&P PST ADULT - NEGATIVE NEUROLOGICAL SYMPTOMS
no difficulty walking/no focal seizures/no headache/no syncope/no tremors/no transient paralysis/no weakness/no paresthesias

## 2019-10-16 NOTE — H&P PST ADULT - MUSCULOSKELETAL
details… detailed exam no joint swelling/ROM intact/no joint warmth/no joint erythema/normal strength/no calf tenderness

## 2019-10-16 NOTE — H&P PST ADULT - PRESSURE ULCER(S)
SUBJECTIVE:   Bhumika Gonsalez is a 27 year old female who presents to clinic today for the following health issues:      Procedure       Duration: today    Description (location/character/radiation): remove Nexplanon    Intensity:  na    Accompanying signs and symptoms: inserted 10/2015    History (similar episodes/previous evaluation): None    Precipitating or alleviating factors: None    Therapies tried and outcome: None     Reviewed and updated as needed this visit by clinical staff  Tobacco  Allergies  Meds  Problems  Med Hx  Surg Hx  Fam Hx  Soc Hx        Reviewed and updated as needed this visit by Provider  Tobacco  Allergies  Meds  Problems  Med Hx  Surg Hx  Fam Hx  Soc Hx        Additional complaints: None    HPI additional notes: Bhumika presents today with   Chief Complaint   Patient presents with     Procedure     Nexplanon removal   Due for removal, pt would not like replacement due to heavier periods.  She would not like a different contraceptive at this time.       ROS:  C: Negative for fever, chills, recent change in weight  Skin: Negative for worrisome rashes or lesions  ENT: Negative for ear, mouth and throat problems  Resp: Negative for significant cough or SOB  CV: Negative for chest pain or peripheral edema  GI: Negative for nausea, abdominal pain, heartburn, or change in bowel habits  MS: Negative for significant arthralgias or myalgias  P: Negative for changes in mood or affect  ROS all other systems negative.    Chart Review:  History   Smoking Status     Never Smoker   Smokeless Tobacco     Never Used       PFSH: Patient is sexually active.       Recent Labs   Lab Test  09/18/18   1025  02/14/17   1056  03/28/14   1029   LDL  108*   --    --    HDL  39*   --    --    TRIG  132   --    --    ALT   --   16   --    CR   --   0.76   --    GFRESTIMATED   --   >90  Non  GFR Calc     --    GFRESTBLACK   --   >90   GFR Calc     --    POTASSIUM   --   3.8    --    TSH  1.16   --   1.75      BP Readings from Last 3 Encounters:   09/28/18 110/60   09/18/18 104/62   08/18/14 101/55    Wt Readings from Last 3 Encounters:   09/28/18 169 lb (76.7 kg)   09/18/18 167 lb 8 oz (76 kg)   08/18/14 147 lb (66.7 kg)            Patient Active Problem List   Diagnosis     Alopecia areata totalis     History reviewed. No pertinent surgical history.  Problem list, Medication list, Allergies, Medical/Social/Surg hx reviewed in SPark!, updated as appropriate.   OBJECTIVE:                                                    /60 (Cuff Size: Adult Regular)  Pulse 64  Temp 98.4  F (36.9  C) (Tympanic)  Resp 16  Wt 169 lb (76.7 kg)  BMI 27.28 kg/m2  Body mass index is 27.28 kg/(m^2).  GENERAL: healthy, alert, in no acute distress  SKIN: no suspicious lesions, no rashes  PSYCH: Alert and oriented times 3;  Able to articulate logical thoughts. Affect is normal.    Diagnostic test results: none      ASSESSMENT/PLAN:                                                            ICD-10-CM    1. Nexplanon removal Z30.46 REMOVAL NEXPLANON       The Nexplanon chioma was located in the left side  arm.  The distal and proximal ends of the chioma were located and marked with a marking pen and the area cleansed with betadine. Approximately 3 cc's of 1% lidocaine with epinephrine was injected into the area under the Nexplanon chioma and a small skin incision made using a #11 blade.  The Nexplanon was gently manipulated until the tip was at the incision. The chioma tip was grasped with a  Meg clamp and was gently removed from the incision.  Both chioma tips were inspected following removal and found to be completely intact.  The incision site was hemostatic and a steri-strip applied to the area along with a small pressure dressing.  Pt tolerated procedure without complication.    Signs of infection discussed.  Pt will call if noted and will put in antibiotic prescription for treatment.  Please see patient  instructions for treatment details.    Return in about 1 year (around 9/28/2019) for Annual Exam.    Jill Muhammad PA-C  [facility]        no

## 2019-10-16 NOTE — H&P PST ADULT - NSICDXPASTMEDICALHX_GEN_ALL_CORE_FT
PAST MEDICAL HISTORY:  Anemia     Aneurysm of iliac artery     Dysphagia     Glaucoma     H/O abdominal aortic aneurysm     HLD (hyperlipidemia)     HTN (hypertension)     Rotator cuff tear right    Vertigo PAST MEDICAL HISTORY:  Anemia     Aneurysm of iliac artery     Dysphagia     Glaucoma     H/O abdominal aortic aneurysm     HLD (hyperlipidemia)     HTN (hypertension)     Left bundle-branch block     Rotator cuff tear right    Vertigo

## 2019-10-16 NOTE — H&P PST ADULT - NEGATIVE OPHTHALMOLOGIC SYMPTOMS
no diplopia/no photophobia/no pain R/no loss of vision R/no blurred vision R/no pain L/no loss of vision L/no blurred vision L

## 2019-10-16 NOTE — H&P PST ADULT - HISTORY OF PRESENT ILLNESS
79 year old male presents to presurgical testing with diagnosis of aneurysm of iliac artery and abdominal aortic aneurysm, without rupture scheduled for endovascular aortic aneurysm repair, left internal iliac artery embolization for 10/31/19. Pt was hospitalized for appendicitis in 2/2019, s/p laparoscopic appendectomy, with incidental finding of aneurysms on CT scan. Pt is asymptomatic.

## 2019-10-16 NOTE — H&P PST ADULT - RS GEN PE MLT RESP DETAILS PC
clear to auscultation bilaterally/no rhonchi/airway patent/breath sounds equal/good air movement/no rales/no wheezes/respirations non-labored

## 2019-10-16 NOTE — H&P PST ADULT - NSICDXFAMILYHX_GEN_ALL_CORE_FT
FAMILY HISTORY:  FH: colon cancer, brother    Sibling  Still living? Unknown  Family history of heart disease, Age at diagnosis: Age Unknown

## 2019-10-16 NOTE — H&P PST ADULT - NSICDXPROBLEM_GEN_ALL_CORE_FT
PROBLEM DIAGNOSES  Problem: H/O abdominal aortic aneurysm  Assessment and Plan: Pt is scheduled for endovascular aortic aneurysm repair, left internal iliac artery embolization for 10/31/19. Pre-op instructions provided. Pt given verbal and written instructions with teach back on chlorhexidine shampoo and pepcid. Pt verbalized understanding with return demonstration.   MELIA precautions, OR booking notified   Pending medical evaluation due to HTN anemia  Pending cardiac evaluation due to LBBB. Spoke to cardiologist this is new onset. Pt is asymptomatic. As per cardiologist, Pt to see the cardiologist as soon as possible for follow up.     Problem: H/O left bundle branch block  Assessment and Plan: Pending cardiac evaluation   Pt instructed to continue ASA as per surgeon    Problem: Hypertension  Assessment and Plan: Pt instructed to take losartan the evening prior to procedure as usual

## 2019-10-30 ENCOUNTER — TRANSCRIPTION ENCOUNTER (OUTPATIENT)
Age: 79
End: 2019-10-30

## 2019-10-31 ENCOUNTER — INPATIENT (INPATIENT)
Facility: HOSPITAL | Age: 79
LOS: 0 days | Discharge: HOME | End: 2019-11-01
Attending: SURGERY | Admitting: SURGERY
Payer: MEDICARE

## 2019-10-31 VITALS
TEMPERATURE: 99 F | HEART RATE: 63 BPM | SYSTOLIC BLOOD PRESSURE: 147 MMHG | RESPIRATION RATE: 16 BRPM | DIASTOLIC BLOOD PRESSURE: 77 MMHG | OXYGEN SATURATION: 100 %

## 2019-10-31 DIAGNOSIS — Z90.49 ACQUIRED ABSENCE OF OTHER SPECIFIED PARTS OF DIGESTIVE TRACT: Chronic | ICD-10-CM

## 2019-10-31 DIAGNOSIS — Z98.890 OTHER SPECIFIED POSTPROCEDURAL STATES: Chronic | ICD-10-CM

## 2019-10-31 DIAGNOSIS — I72.3 ANEURYSM OF ILIAC ARTERY: ICD-10-CM

## 2019-10-31 DIAGNOSIS — Z86.79 PERSONAL HISTORY OF OTHER DISEASES OF THE CIRCULATORY SYSTEM: ICD-10-CM

## 2019-10-31 DIAGNOSIS — I10 ESSENTIAL (PRIMARY) HYPERTENSION: ICD-10-CM

## 2019-10-31 LAB
ANION GAP SERPL CALC-SCNC: 10 MMO/L — SIGNIFICANT CHANGE UP (ref 7–14)
BASE EXCESS BLDA CALC-SCNC: -0.5 MMOL/L — SIGNIFICANT CHANGE UP
BASE EXCESS BLDA CALC-SCNC: 0.6 MMOL/L — SIGNIFICANT CHANGE UP
BUN SERPL-MCNC: 15 MG/DL — SIGNIFICANT CHANGE UP (ref 7–23)
CA-I BLDA-SCNC: 1.19 MMOL/L — SIGNIFICANT CHANGE UP (ref 1.15–1.29)
CA-I BLDA-SCNC: 1.24 MMOL/L — SIGNIFICANT CHANGE UP (ref 1.15–1.29)
CALCIUM SERPL-MCNC: 8.9 MG/DL — SIGNIFICANT CHANGE UP (ref 8.4–10.5)
CHLORIDE SERPL-SCNC: 106 MMOL/L — SIGNIFICANT CHANGE UP (ref 98–107)
CO2 SERPL-SCNC: 24 MMOL/L — SIGNIFICANT CHANGE UP (ref 22–31)
CREAT SERPL-MCNC: 0.94 MG/DL — SIGNIFICANT CHANGE UP (ref 0.5–1.3)
GLUCOSE BLDA-MCNC: 89 MG/DL — SIGNIFICANT CHANGE UP (ref 70–99)
GLUCOSE BLDA-MCNC: 97 MG/DL — SIGNIFICANT CHANGE UP (ref 70–99)
GLUCOSE SERPL-MCNC: 91 MG/DL — SIGNIFICANT CHANGE UP (ref 70–99)
HCO3 BLDA-SCNC: 24 MMOL/L — SIGNIFICANT CHANGE UP (ref 22–26)
HCO3 BLDA-SCNC: 25 MMOL/L — SIGNIFICANT CHANGE UP (ref 22–26)
HCT VFR BLD CALC: 29.4 % — LOW (ref 39–50)
HCT VFR BLDA CALC: 30.3 % — LOW (ref 39–51)
HCT VFR BLDA CALC: 37.9 % — LOW (ref 39–51)
HGB BLD-MCNC: 9.5 G/DL — LOW (ref 13–17)
HGB BLDA-MCNC: 12.3 G/DL — LOW (ref 13–17)
HGB BLDA-MCNC: 9.8 G/DL — LOW (ref 13–17)
MCHC RBC-ENTMCNC: 28.4 PG — SIGNIFICANT CHANGE UP (ref 27–34)
MCHC RBC-ENTMCNC: 32.3 % — SIGNIFICANT CHANGE UP (ref 32–36)
MCV RBC AUTO: 87.8 FL — SIGNIFICANT CHANGE UP (ref 80–100)
NRBC # FLD: 0 K/UL — SIGNIFICANT CHANGE UP (ref 0–0)
PCO2 BLDA: 38 MMHG — SIGNIFICANT CHANGE UP (ref 35–48)
PCO2 BLDA: 42 MMHG — SIGNIFICANT CHANGE UP (ref 35–48)
PH BLDA: 7.39 PH — SIGNIFICANT CHANGE UP (ref 7.35–7.45)
PH BLDA: 7.41 PH — SIGNIFICANT CHANGE UP (ref 7.35–7.45)
PLATELET # BLD AUTO: 145 K/UL — LOW (ref 150–400)
PMV BLD: 10.4 FL — SIGNIFICANT CHANGE UP (ref 7–13)
PO2 BLDA: 126 MMHG — HIGH (ref 83–108)
PO2 BLDA: 198 MMHG — HIGH (ref 83–108)
POTASSIUM BLDA-SCNC: 4 MMOL/L — SIGNIFICANT CHANGE UP (ref 3.4–4.5)
POTASSIUM BLDA-SCNC: 4.1 MMOL/L — SIGNIFICANT CHANGE UP (ref 3.4–4.5)
POTASSIUM SERPL-MCNC: 4 MMOL/L — SIGNIFICANT CHANGE UP (ref 3.5–5.3)
POTASSIUM SERPL-SCNC: 4 MMOL/L — SIGNIFICANT CHANGE UP (ref 3.5–5.3)
RBC # BLD: 3.35 M/UL — LOW (ref 4.2–5.8)
RBC # FLD: 13.6 % — SIGNIFICANT CHANGE UP (ref 10.3–14.5)
RH IG SCN BLD-IMP: POSITIVE — SIGNIFICANT CHANGE UP
SAO2 % BLDA: 98.7 % — SIGNIFICANT CHANGE UP (ref 95–99)
SAO2 % BLDA: 99.5 % — HIGH (ref 95–99)
SODIUM BLDA-SCNC: 138 MMOL/L — SIGNIFICANT CHANGE UP (ref 136–146)
SODIUM BLDA-SCNC: 138 MMOL/L — SIGNIFICANT CHANGE UP (ref 136–146)
SODIUM SERPL-SCNC: 140 MMOL/L — SIGNIFICANT CHANGE UP (ref 135–145)
WBC # BLD: 7.64 K/UL — SIGNIFICANT CHANGE UP (ref 3.8–10.5)
WBC # FLD AUTO: 7.64 K/UL — SIGNIFICANT CHANGE UP (ref 3.8–10.5)

## 2019-10-31 PROCEDURE — 76937 US GUIDE VASCULAR ACCESS: CPT | Mod: 26,GC

## 2019-10-31 PROCEDURE — 34709 PLMT XTN PROSTH EVASC RPR: CPT | Mod: GC

## 2019-10-31 PROCEDURE — 34705 EVAC RPR A-BIILIAC NDGFT: CPT | Mod: GC

## 2019-10-31 PROCEDURE — 37242 VASC EMBOLIZE/OCCLUDE ARTERY: CPT | Mod: GC

## 2019-10-31 RX ORDER — ROSUVASTATIN CALCIUM 5 MG/1
1 TABLET ORAL
Qty: 0 | Refills: 0 | DISCHARGE

## 2019-10-31 RX ORDER — FERROUS SULFATE 325(65) MG
1 TABLET ORAL
Qty: 0 | Refills: 0 | DISCHARGE

## 2019-10-31 RX ORDER — ONDANSETRON 8 MG/1
4 TABLET, FILM COATED ORAL ONCE
Refills: 0 | Status: COMPLETED | OUTPATIENT
Start: 2019-10-31 | End: 2019-10-31

## 2019-10-31 RX ORDER — ATORVASTATIN CALCIUM 80 MG/1
40 TABLET, FILM COATED ORAL AT BEDTIME
Refills: 0 | Status: DISCONTINUED | OUTPATIENT
Start: 2019-10-31 | End: 2019-11-01

## 2019-10-31 RX ORDER — LOSARTAN POTASSIUM 100 MG/1
1 TABLET, FILM COATED ORAL
Qty: 0 | Refills: 0 | DISCHARGE

## 2019-10-31 RX ORDER — ASPIRIN/CALCIUM CARB/MAGNESIUM 324 MG
1 TABLET ORAL
Qty: 0 | Refills: 0 | DISCHARGE

## 2019-10-31 RX ORDER — ASPIRIN/CALCIUM CARB/MAGNESIUM 324 MG
81 TABLET ORAL DAILY
Refills: 0 | Status: DISCONTINUED | OUTPATIENT
Start: 2019-10-31 | End: 2019-11-01

## 2019-10-31 RX ORDER — ASCORBIC ACID 60 MG
1 TABLET,CHEWABLE ORAL
Qty: 0 | Refills: 0 | DISCHARGE

## 2019-10-31 RX ORDER — TIMOLOL 0.5 %
1 DROPS OPHTHALMIC (EYE)
Qty: 0 | Refills: 0 | DISCHARGE

## 2019-10-31 RX ORDER — PSYLLIUM SEED (WITH DEXTROSE)
1 POWDER (GRAM) ORAL
Qty: 0 | Refills: 0 | DISCHARGE

## 2019-10-31 RX ORDER — HYDROMORPHONE HYDROCHLORIDE 2 MG/ML
0.5 INJECTION INTRAMUSCULAR; INTRAVENOUS; SUBCUTANEOUS
Refills: 0 | Status: DISCONTINUED | OUTPATIENT
Start: 2019-10-31 | End: 2019-11-01

## 2019-10-31 RX ORDER — HEPARIN SODIUM 5000 [USP'U]/ML
5000 INJECTION INTRAVENOUS; SUBCUTANEOUS EVERY 8 HOURS
Refills: 0 | Status: DISCONTINUED | OUTPATIENT
Start: 2019-10-31 | End: 2019-11-01

## 2019-10-31 RX ORDER — LOSARTAN POTASSIUM 100 MG/1
100 TABLET, FILM COATED ORAL DAILY
Refills: 0 | Status: DISCONTINUED | OUTPATIENT
Start: 2019-10-31 | End: 2019-11-01

## 2019-10-31 RX ORDER — OXYCODONE AND ACETAMINOPHEN 5; 325 MG/1; MG/1
1 TABLET ORAL EVERY 4 HOURS
Refills: 0 | Status: DISCONTINUED | OUTPATIENT
Start: 2019-10-31 | End: 2019-11-01

## 2019-10-31 RX ORDER — MULTIVIT-MIN/FERROUS GLUCONATE 9 MG/15 ML
1 LIQUID (ML) ORAL
Qty: 0 | Refills: 0 | DISCHARGE

## 2019-10-31 RX ADMIN — SODIUM CHLORIDE 30 MILLILITER(S): 9 INJECTION, SOLUTION INTRAVENOUS at 06:36

## 2019-10-31 RX ADMIN — OXYCODONE AND ACETAMINOPHEN 1 TABLET(S): 5; 325 TABLET ORAL at 21:10

## 2019-10-31 RX ADMIN — Medication 1.25 MILLIGRAM(S): at 15:29

## 2019-10-31 RX ADMIN — OXYCODONE AND ACETAMINOPHEN 1 TABLET(S): 5; 325 TABLET ORAL at 20:40

## 2019-10-31 RX ADMIN — ATORVASTATIN CALCIUM 40 MILLIGRAM(S): 80 TABLET, FILM COATED ORAL at 22:56

## 2019-10-31 RX ADMIN — ONDANSETRON 4 MILLIGRAM(S): 8 TABLET, FILM COATED ORAL at 20:00

## 2019-10-31 RX ADMIN — HEPARIN SODIUM 5000 UNIT(S): 5000 INJECTION INTRAVENOUS; SUBCUTANEOUS at 22:56

## 2019-10-31 RX ADMIN — HYDROMORPHONE HYDROCHLORIDE 0.5 MILLIGRAM(S): 2 INJECTION INTRAMUSCULAR; INTRAVENOUS; SUBCUTANEOUS at 16:20

## 2019-10-31 RX ADMIN — Medication 81 MILLIGRAM(S): at 23:06

## 2019-10-31 RX ADMIN — HYDROMORPHONE HYDROCHLORIDE 0.5 MILLIGRAM(S): 2 INJECTION INTRAMUSCULAR; INTRAVENOUS; SUBCUTANEOUS at 15:52

## 2019-10-31 NOTE — CHART NOTE - NSCHARTNOTEFT_GEN_A_CORE
STATUS POST:  Endovascular repair of infrarenal abdominal aorta and iliac arteries bilaterally    SUBJECTIVE: Pt seen in PACU 4 hours after procedure. Pt tolerated procedure well. Pt reports:  SOB:  [ ] YES [x] NO  Chest Discomfort: [ ] YES [x] NO    Nausea: [x] YES [ ] NO - Subsided           Vomiting: [ ] YES [x] NO  Flatus: [ ] YES [x] NO             Bowel Movement: [ ] YES [x] NO  Diarrhea: [ ] YES [x] NO         Void: [x]YES [ ]No - Prakash in place  Constipation: [ ] YES [x] NO     Pain (0-10): 1             Pain Control Adequate: [x] YES [ ] NO  Tolerating PO sips of water: [x] YES [ ] NO      OBJECTIVE:  PHYSICAL EXAM:  Gen: AAOx3, NAD  HEENT: NC/AT  RESP: Non labored breathing  ABDOMEN: Soft, NT, ND.   Groin: Soft, (+) Incision site with dermabond C/D/I  Ext: (+) DP, PT b/l. Warm.       Vital Signs Last 24 Hrs  T(C): 36.5 (31 Oct 2019 20:00), Max: 37.2 (31 Oct 2019 06:19)  T(F): 97.7 (31 Oct 2019 20:00), Max: 98.9 (31 Oct 2019 06:19)  HR: 62 (31 Oct 2019 20:00) (61 - 75)  BP: 134/67 (31 Oct 2019 20:00) (126/62 - 150/84)  BP(mean): 86 (31 Oct 2019 20:00) (80 - 104)  RR: 12 (31 Oct 2019 20:00) (9 - 21)  SpO2: 96% (31 Oct 2019 20:00) (96% - 100%)      A/P: 79y Male s/p EVAR  - Diet: Regular  - Activity: OOB to chair on POD#1  - Labs: H.5, Hct: 29.4, Electrolytes wnl  - Pain medication: Percocet STATUS POST:  Endovascular repair of infrarenal abdominal aorta and iliac arteries bilaterally    SUBJECTIVE: Pt seen in PACU 4 hours after procedure. Pt tolerated procedure well. Pt reports:  SOB:  [ ] YES [x] NO  Chest Discomfort: [ ] YES [x] NO    Nausea: [x] YES [ ] NO - Subsided           Vomiting: [ ] YES [x] NO  Flatus: [ ] YES [x] NO             Bowel Movement: [ ] YES [x] NO  Diarrhea: [ ] YES [x] NO         Void: [x]YES [ ]No - Prakash in place  Constipation: [ ] YES [x] NO     Pain (0-10): 1             Pain Control Adequate: [x] YES [ ] NO  Tolerating PO sips of water: [x] YES [ ] NO      OBJECTIVE:  PHYSICAL EXAM:  Gen: AAOx3, NAD  HEENT: NC/AT  RESP: Non labored breathing  ABDOMEN: Soft, NT, ND.   Groin: Soft, (+) Incision site with dermabond C/D/I  Ext: (+) palpable DP, PT b/l. Warm.       Vital Signs Last 24 Hrs  T(C): 36.5 (31 Oct 2019 20:00), Max: 37.2 (31 Oct 2019 06:19)  T(F): 97.7 (31 Oct 2019 20:00), Max: 98.9 (31 Oct 2019 06:19)  HR: 62 (31 Oct 2019 20:00) (61 - 75)  BP: 134/67 (31 Oct 2019 20:00) (126/62 - 150/84)  BP(mean): 86 (31 Oct 2019 20:00) (80 - 104)  RR: 12 (31 Oct 2019 20:00) (9 - 21)  SpO2: 96% (31 Oct 2019 20:00) (96% - 100%)      A/P: 79y Male s/p EVAR  - D/C A-line  - to Floor  - Diet: Regular  - Activity: OOB to chair on POD#1  - Labs: H.5, Hct: 29.4, Electrolytes wnl  - Pain medication: Percocet

## 2019-10-31 NOTE — BRIEF OPERATIVE NOTE - NSICDXBRIEFPROCEDURE_GEN_ALL_CORE_FT
PROCEDURES:  Endovascular repair of infrarenal abdominal aorta and iliac arteries on both sides of pelvis 31-Oct-2019 16:40:08  Deena Hale

## 2019-11-01 ENCOUNTER — TRANSCRIPTION ENCOUNTER (OUTPATIENT)
Age: 79
End: 2019-11-01

## 2019-11-01 VITALS
HEART RATE: 81 BPM | DIASTOLIC BLOOD PRESSURE: 58 MMHG | TEMPERATURE: 99 F | RESPIRATION RATE: 18 BRPM | OXYGEN SATURATION: 98 % | SYSTOLIC BLOOD PRESSURE: 107 MMHG

## 2019-11-01 RX ORDER — LOSARTAN POTASSIUM 100 MG/1
100 TABLET, FILM COATED ORAL DAILY
Refills: 0 | Status: DISCONTINUED | OUTPATIENT
Start: 2019-11-01 | End: 2019-11-01

## 2019-11-01 RX ADMIN — HEPARIN SODIUM 5000 UNIT(S): 5000 INJECTION INTRAVENOUS; SUBCUTANEOUS at 05:39

## 2019-11-01 RX ADMIN — LOSARTAN POTASSIUM 100 MILLIGRAM(S): 100 TABLET, FILM COATED ORAL at 05:39

## 2019-11-01 RX ADMIN — OXYCODONE AND ACETAMINOPHEN 1 TABLET(S): 5; 325 TABLET ORAL at 05:39

## 2019-11-01 RX ADMIN — OXYCODONE AND ACETAMINOPHEN 1 TABLET(S): 5; 325 TABLET ORAL at 06:26

## 2019-11-01 RX ADMIN — Medication 81 MILLIGRAM(S): at 11:16

## 2019-11-01 NOTE — DISCHARGE NOTE PROVIDER - CARE PROVIDER_API CALL
Waylon Camp)  Vascular Surgery  [address], [address] [place] [address]  Phone: [phone]  Fax: [phone]  Follow Up Time: 1 week

## 2019-11-01 NOTE — DISCHARGE NOTE PROVIDER - NSDCCPTREATMENT_GEN_ALL_CORE_FT
PRINCIPAL PROCEDURE  Procedure: Endovascular repair of infrarenal abdominal aorta and iliac arteries on both sides of pelvis  Findings and Treatment:

## 2019-11-01 NOTE — DISCHARGE NOTE NURSING/CASE MANAGEMENT/SOCIAL WORK - PATIENT PORTAL LINK FT
You can access the FollowMyHealth Patient Portal offered by [facility] by registering at the following website: [email or web address]. By joining [facility]’s FollowMyHealth portal, you will also be able to view your health information using other applications (apps) compatible with our system.

## 2019-11-01 NOTE — DISCHARGE NOTE PROVIDER - HOSPITAL COURSE
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 Patient went to the OR with Dr Camp on 10/31 and had an EVAR. Post op patients diet was slowly advanced as tolerated.  At this time, pt is tolerating a regular diet, ambulating and voiding.  Pt has been deemed stable for discharge at this time.

## 2019-11-01 NOTE — PROGRESS NOTE ADULT - ASSESSMENT
Assessment:  78 M POD1 EVAR.    Plan:  -Continue diet  - f/u am labs  - Plan for HD today, contact nephrology and f/u recs  - Dispo planning    Vascular surgery/ [id number] Assessment:  78 M POD1 EVAR.    Plan:  -Continue diet  - Dispo planning    Vascular surgery/ [id number]

## 2019-11-01 NOTE — DISCHARGE NOTE PROVIDER - NSDCCPCAREPLAN_GEN_ALL_CORE_FT
PRINCIPAL DISCHARGE DIAGNOSIS  Diagnosis: H/O abdominal aortic aneurysm  Assessment and Plan of Treatment:

## 2019-11-01 NOTE — DISCHARGE NOTE PROVIDER - NSDCFUSCHEDAPPT_GEN_ALL_CORE_FT
BRENNAN MCKEON ; 12/19/2019 ; NPP Rheum 95 01 [facility] BRENNAN MCKEON ; 12/19/2019 ; NPP Rheum 95 46 [facility] BRENNAN MCKEON ; 12/19/2019 ; NPP Rheum 95 67 [facility] BRENNAN MCKEON ; 12/19/2019 ; NPP Rheum 95 76 [facility]

## 2019-11-01 NOTE — DISCHARGE NOTE PROVIDER - NSDCMRMEDTOKEN_GEN_ALL_CORE_FT
aspirin 81 mg oral tablet: 1 tab(s) orally once a day  Centrum Silver oral tablet: 1 tab(s) orally once a day  last dose on 10/23/19  ferrous sulfate 325 mg (65 mg elemental iron) oral tablet: 1 tab(s) orally once a day  losartan 100 mg oral tablet: 1 tab(s) orally once a day (at bedtime)  Metamucil: 1  orally once a day  rosuvastatin 10 mg oral tablet: 1 tab(s) orally once a day  timolol hemihydrate 0.5% ophthalmic solution: 1 drop(s) to each affected eye once a day  Vitamin C 1000 mg oral tablet: 1 tab(s) orally once a day aspirin 81 mg oral tablet: 1 tab(s) orally once a day  Centrum Silver oral tablet: 1 tab(s) orally once a day  last dose on 10/23/19  ferrous sulfate 325 mg (65 mg elemental iron) oral tablet: 1 tab(s) orally once a day  losartan 100 mg oral tablet: 1 tab(s) orally once a day (at bedtime)  Metamucil: 1  orally once a day  oxycodone-acetaminophen 5 mg-325 mg oral tablet: 1 tab(s) orally every 4 hours, As needed, Moderate Pain (4 - 6) MDD:6  rosuvastatin 10 mg oral tablet: 1 tab(s) orally once a day  timolol hemihydrate 0.5% ophthalmic solution: 1 drop(s) to each affected eye once a day  Vitamin C 1000 mg oral tablet: 1 tab(s) orally once a day

## 2019-11-01 NOTE — DISCHARGE NOTE PROVIDER - NSDCFUADDINST_GEN_ALL_CORE_FT
Please avoid heavy exercise or lifting for 6 weeks. Please resume home medications and diet. Please follow up with Dr. Camp in 1 week. Please keep procedure sites clean. You may begin bathing today by allowing water and soap to run over procedure site. Please avoid heavy exercise or lifting for 6 weeks. Please resume home medications and diet. Please follow up with Dr. Camp in 1 week. Please keep procedure sites clean. You may begin bathing today by allowing water and soap to run over procedure site.    Please call or seek medical attention if you develop new abdominal pain or any leg weakness.

## 2019-11-05 PROBLEM — R13.10 DYSPHAGIA, UNSPECIFIED: Chronic | Status: ACTIVE | Noted: 2019-10-16

## 2019-11-05 PROBLEM — H40.9 UNSPECIFIED GLAUCOMA: Chronic | Status: ACTIVE | Noted: 2019-10-16

## 2019-11-05 PROBLEM — Z86.79 PERSONAL HISTORY OF OTHER DISEASES OF THE CIRCULATORY SYSTEM: Chronic | Status: ACTIVE | Noted: 2019-10-16

## 2019-11-05 PROBLEM — R42 DIZZINESS AND GIDDINESS: Chronic | Status: ACTIVE | Noted: 2019-10-16

## 2019-11-05 PROBLEM — I72.3 ANEURYSM OF ILIAC ARTERY: Chronic | Status: ACTIVE | Noted: 2019-10-16

## 2019-11-05 PROBLEM — D64.9 ANEMIA, UNSPECIFIED: Chronic | Status: ACTIVE | Noted: 2019-10-16

## 2019-11-05 PROBLEM — M75.100 UNSPECIFIED ROTATOR CUFF TEAR OR RUPTURE OF UNSPECIFIED SHOULDER, NOT SPECIFIED AS TRAUMATIC: Chronic | Status: ACTIVE | Noted: 2019-10-16

## 2019-11-05 PROBLEM — I44.7 LEFT BUNDLE-BRANCH BLOCK, UNSPECIFIED: Chronic | Status: ACTIVE | Noted: 2019-10-16

## 2019-11-08 ENCOUNTER — APPOINTMENT (OUTPATIENT)
Dept: VASCULAR SURGERY | Facility: CLINIC | Age: 79
End: 2019-11-08
Payer: MEDICARE

## 2019-11-08 VITALS
TEMPERATURE: 97.3 F | WEIGHT: 180 LBS | DIASTOLIC BLOOD PRESSURE: 70 MMHG | SYSTOLIC BLOOD PRESSURE: 137 MMHG | HEIGHT: 74 IN | HEART RATE: 66 BPM | BODY MASS INDEX: 23.1 KG/M2

## 2019-11-08 PROCEDURE — 99024 POSTOP FOLLOW-UP VISIT: CPT

## 2019-11-26 ENCOUNTER — APPOINTMENT (OUTPATIENT)
Dept: VASCULAR SURGERY | Facility: CLINIC | Age: 79
End: 2019-11-26

## 2019-12-09 ENCOUNTER — APPOINTMENT (OUTPATIENT)
Dept: VASCULAR SURGERY | Facility: CLINIC | Age: 79
End: 2019-12-09
Payer: MEDICARE

## 2019-12-09 VITALS — TEMPERATURE: 98.8 F | DIASTOLIC BLOOD PRESSURE: 90 MMHG | HEART RATE: 66 BPM | SYSTOLIC BLOOD PRESSURE: 169 MMHG

## 2019-12-09 PROCEDURE — 93978 VASCULAR STUDY: CPT

## 2019-12-09 PROCEDURE — 99024 POSTOP FOLLOW-UP VISIT: CPT

## 2019-12-19 ENCOUNTER — APPOINTMENT (OUTPATIENT)
Dept: RHEUMATOLOGY | Facility: CLINIC | Age: 79
End: 2019-12-19

## 2020-02-18 ENCOUNTER — APPOINTMENT (OUTPATIENT)
Dept: UROLOGY | Facility: CLINIC | Age: 80
End: 2020-02-18
Payer: MEDICARE

## 2020-02-18 VITALS
HEIGHT: 72 IN | BODY MASS INDEX: 24.38 KG/M2 | SYSTOLIC BLOOD PRESSURE: 139 MMHG | DIASTOLIC BLOOD PRESSURE: 77 MMHG | WEIGHT: 180 LBS | HEART RATE: 74 BPM

## 2020-02-18 DIAGNOSIS — Z80.9 FAMILY HISTORY OF MALIGNANT NEOPLASM, UNSPECIFIED: ICD-10-CM

## 2020-02-18 PROCEDURE — 99204 OFFICE O/P NEW MOD 45 MIN: CPT

## 2020-02-18 RX ORDER — LOSARTAN POTASSIUM 100 MG/1
TABLET, FILM COATED ORAL
Refills: 0 | Status: ACTIVE | COMMUNITY

## 2020-02-18 RX ORDER — ASPIRIN 81 MG
81 TABLET,CHEWABLE ORAL
Refills: 0 | Status: ACTIVE | COMMUNITY

## 2020-02-18 RX ORDER — ROSUVASTATIN CALCIUM 10 MG/1
10 TABLET, FILM COATED ORAL
Refills: 0 | Status: ACTIVE | COMMUNITY

## 2020-02-18 NOTE — HISTORY OF PRESENT ILLNESS
[FreeTextEntry1] : Very pleasant 79-year-old gentleman who presents for evaluation of left renal mass. Patient recently underwent a CT angiogram which demonstrated a 1 cm indeterminate left renal lesion, concerning for either a complex cyst or a solid renal mass. He denies knowledge of a renal mass in the past. He denies dysuria. No hematuria. No flank pain or suprapubic pain. He reports no specific timing. No aggravating or alleviating factors that he knows of. No family history of  malignancy, including kidney cancer.\par \par He does not smoke. [None] : no symptoms

## 2020-02-18 NOTE — PHYSICAL EXAM
[General Appearance - Well Developed] : well developed [General Appearance - Well Nourished] : well nourished [Normal Appearance] : normal appearance [Well Groomed] : well groomed [General Appearance - In No Acute Distress] : no acute distress [Edema] : no peripheral edema [Abdomen Soft] : soft [Exaggerated Use Of Accessory Muscles For Inspiration] : no accessory muscle use [Abdomen Tenderness] : non-tender [Respiration, Rhythm And Depth] : normal respiratory rhythm and effort [Costovertebral Angle Tenderness] : no ~M costovertebral angle tenderness [Urinary Bladder Findings] : the bladder was normal on palpation [Normal Station and Gait] : the gait and station were normal for the patient's age [Oriented To Time, Place, And Person] : oriented to person, place, and time [No Focal Deficits] : no focal deficits [] : no rash [Not Anxious] : not anxious [Mood] : the mood was normal [Affect] : the affect was normal [No Palpable Adenopathy] : no palpable adenopathy

## 2020-02-18 NOTE — ASSESSMENT
[FreeTextEntry1] : Very pleasant 79-year-old gentleman presents for evaluation of 1 cm left indeterminate renal lesion on CT scan\par -CT images reviewed with the patient\par -We discussed potential etiologies of a 1 cm renal lesion\par -MRI renal mass protocol\par -Followup in 2-3 weeks

## 2020-03-04 ENCOUNTER — APPOINTMENT (OUTPATIENT)
Dept: UROLOGY | Facility: CLINIC | Age: 80
End: 2020-03-04
Payer: MEDICARE

## 2020-03-04 VITALS
BODY MASS INDEX: 24.38 KG/M2 | HEIGHT: 72 IN | SYSTOLIC BLOOD PRESSURE: 119 MMHG | DIASTOLIC BLOOD PRESSURE: 69 MMHG | HEART RATE: 79 BPM | WEIGHT: 180 LBS

## 2020-03-04 DIAGNOSIS — N28.89 OTHER SPECIFIED DISORDERS OF KIDNEY AND URETER: ICD-10-CM

## 2020-03-04 PROCEDURE — 99213 OFFICE O/P EST LOW 20 MIN: CPT

## 2020-03-04 NOTE — ASSESSMENT
[FreeTextEntry1] : Very pleasant 79-year-old gentleman who presents for followup of left renal cyst\par -MRI images from [facility] reviewed\par -We discussed the benign nature of renal cysts\par -Follow up in one year with renal ultrasound

## 2020-03-04 NOTE — PHYSICAL EXAM
[General Appearance - Well Developed] : well developed [Well Groomed] : well groomed [General Appearance - Well Nourished] : well nourished [General Appearance - In No Acute Distress] : no acute distress [Normal Appearance] : normal appearance [Abdomen Soft] : soft [Costovertebral Angle Tenderness] : no ~M costovertebral angle tenderness [Abdomen Tenderness] : non-tender [Urinary Bladder Findings] : the bladder was normal on palpation [Edema] : no peripheral edema [Respiration, Rhythm And Depth] : normal respiratory rhythm and effort [] : no respiratory distress [Exaggerated Use Of Accessory Muscles For Inspiration] : no accessory muscle use [Oriented To Time, Place, And Person] : oriented to person, place, and time [Mood] : the mood was normal [Not Anxious] : not anxious [Affect] : the affect was normal [Normal Station and Gait] : the gait and station were normal for the patient's age [No Palpable Adenopathy] : no palpable adenopathy [No Focal Deficits] : no focal deficits

## 2020-03-04 NOTE — HISTORY OF PRESENT ILLNESS
[FreeTextEntry1] : Very pleasant 79-year-old gentleman who presents for follow up of complex left renal cyst. Patient recently underwent a CT angiogram which demonstrated a 1 cm indeterminate left renal lesion, concerning for either a complex cyst or a solid renal mass. Michael jimenes he underwent a MRI which demonstrated a small 9 mm cyst within internal septation, nonenhancing. No flank pain or suprapubic pain. No other complaints. [None] : no symptoms

## 2020-03-14 NOTE — H&P PST ADULT - HEMATOLOGY/LYMPHATICS
-   Increase oral fluids to loosen and thin secretions, eat a nutritious diet  -   Tylenol or ibuprofen for pain as packet insert   -   Robitussin DM, Mucinex DM, or generic equivalent for cough as packet insert  -   Return to clinic if symptoms persist or It can also happen due to allergies to pollens and other particles in the air. Sinusitis can cause symptoms of sinus congestion and a feeling of fullness. A sinus infection causes fever, headache, and facial pain.  There is often green or yellow fluid drain medicine was prescribed to you. If you have chronic liver or kidney disease or ever had a stomach ulcer, talk with your healthcare provider before using these medicines. (Aspirin should never be taken by anyone under age 25 who is ill with a fever.  It may of treatment. Home care  The following are general care guidelines:  · Finish all of the antibiotic medicine given, even though you may feel better after the first few days.   · You may use over-the-counter medicine, such as acetaminophen or ibuprofen, t details…

## 2020-06-18 NOTE — PATIENT PROFILE ADULT - FLU SEASON?
General: +fever, +decreased activity  Pulmonary: [X] Neg  Cardiac: [X] Neg  Gastrointestinal: +abd pain during stooling, +pale BM x1  Ears, Nose, Throat: +b/l conjunctival injection  Renal/Urologic: [X] Neg  Musculoskeletal: [X] Neg  Endocrine: [X] Neg  Hematologic: [X] Neg  Neurologic: [X] Neg  Allergy/Immunologic: [X] Neg  All other systems reviewed and negative [X] Yes...

## 2021-03-10 ENCOUNTER — APPOINTMENT (OUTPATIENT)
Dept: UROLOGY | Facility: CLINIC | Age: 81
End: 2021-03-10
Payer: MEDICARE

## 2021-03-10 PROCEDURE — 99213 OFFICE O/P EST LOW 20 MIN: CPT | Mod: 25

## 2021-03-10 PROCEDURE — 76775 US EXAM ABDO BACK WALL LIM: CPT

## 2021-03-10 NOTE — HISTORY OF PRESENT ILLNESS
[FreeTextEntry1] : Very pleasant 80-year-old gentleman who presents for follow up of complex left renal cyst. Patient underwent a CT angiogram last year which demonstrated a 1 cm indeterminate left renal lesion, concerning for either a complex cyst or a solid renal mass.  Prior to this he underwent a MRI which demonstrated a small 9 mm cyst within internal septation, nonenhancing. No flank pain or suprapubic pain. No other complaints.\par \par He underwent a repeat renal ultrasound today which demonstrates no abnormalities, and cyst was unable to be visualized. [None] : no symptoms

## 2021-03-10 NOTE — ASSESSMENT
[FreeTextEntry1] : Very pleasant 80-year-old gentleman presents for follow-up of complex renal cyst and indeterminate lesion\par -Ultrasound images reviewed with the patient demonstrating nonvisualization of previously visualized renal lesion\par -We discussed that an aggressive cancer is very unlikely given findings on prior MRI and CT scan as well as renal ultrasound which demonstrates no abnormalities\par -Follow-up in 1 year with repeat renal ultrasound

## 2022-03-02 ENCOUNTER — APPOINTMENT (OUTPATIENT)
Dept: UROLOGY | Facility: CLINIC | Age: 82
End: 2022-03-02
Payer: MEDICARE

## 2022-03-02 ENCOUNTER — APPOINTMENT (OUTPATIENT)
Dept: UROLOGY | Facility: CLINIC | Age: 82
End: 2022-03-02

## 2022-03-02 PROCEDURE — 99213 OFFICE O/P EST LOW 20 MIN: CPT | Mod: 25

## 2022-03-02 PROCEDURE — 76775 US EXAM ABDO BACK WALL LIM: CPT

## 2022-03-02 NOTE — HISTORY OF PRESENT ILLNESS
[FreeTextEntry1] : Very pleasant 81-year-old gentleman who presents for follow up of complex left renal cyst. Patient underwent a CT angiogram last year which demonstrated a 1 cm indeterminate left renal lesion, concerning for either a complex cyst or a solid renal mass.  Prior to this he underwent a MRI which demonstrated a small 9 mm cyst within internal septation, nonenhancing. No flank pain or suprapubic pain. No other complaints.\par \par He underwent a repeat renal ultrasound today which demonstrates no abnormalities, and cyst was unable to be visualized.

## 2022-03-02 NOTE — ASSESSMENT
[FreeTextEntry1] : Very pleasant 81-year-old gentleman who presents for follow-up of complex left renal cyst\par -Ultrasound images reviewed today demonstrating no concerning findings\par -We again discussed the usual benign nature of renal cysts\par -Repeat renal ultrasound in 1 year and follow-up at that time

## 2022-12-09 ENCOUNTER — APPOINTMENT (OUTPATIENT)
Dept: UROLOGY | Facility: CLINIC | Age: 82
End: 2022-12-09

## 2022-12-09 VITALS
TEMPERATURE: 97.7 F | OXYGEN SATURATION: 91 % | SYSTOLIC BLOOD PRESSURE: 109 MMHG | HEIGHT: 72 IN | HEART RATE: 87 BPM | WEIGHT: 184 LBS | DIASTOLIC BLOOD PRESSURE: 60 MMHG | BODY MASS INDEX: 24.92 KG/M2

## 2022-12-09 PROCEDURE — 99213 OFFICE O/P EST LOW 20 MIN: CPT

## 2022-12-09 NOTE — HISTORY OF PRESENT ILLNESS
[FreeTextEntry1] : Very pleasant 82-year-old gentleman who presents for evaluation of elevated PSA found on recent blood test from 6 months ago to 5.4.  This was rechecked a few weeks later and was found to be 2.2.  Reports that he had a prostate biopsy with Dr. Valderrama many years ago for an elevated PSA.  This was benign.  No suprapubic pain. No dysuria.  No increased urinary frequency or urgency.  Nocturia x . No history of urinary tract infections or renal impairment. No gross hematuria.  No aggravating or alleviating factors. No history of urinary tract instrumentation in the past.\par \par No family history of  malignancy, including prostate cancer.  No family history of nephrolithiasis.

## 2022-12-09 NOTE — ASSESSMENT
[FreeTextEntry1] : Very pleasant 82-year-old-gentleman who presents for evaluation of elevated PSA, elevated PSA\par -We discussed the potential etiologies of an elevated PSA, including but not limited to prostate cancer, urinary tract infections, prostatitis, BPH, and recent ejaculation.\par -PSA 5.4 but subsequently decreased to 2.2\par -Patient reports a negative prostate biopsy in the past with Dr. Valderrama\par -Follow-up in 3 months with renal ultrasound for surveillance of complex renal cyst

## 2023-01-13 ENCOUNTER — APPOINTMENT (OUTPATIENT)
Dept: VASCULAR SURGERY | Facility: CLINIC | Age: 83
End: 2023-01-13

## 2023-03-03 ENCOUNTER — APPOINTMENT (OUTPATIENT)
Dept: UROLOGY | Facility: CLINIC | Age: 83
End: 2023-03-03
Payer: MEDICARE

## 2023-03-03 PROCEDURE — 99213 OFFICE O/P EST LOW 20 MIN: CPT

## 2023-03-03 PROCEDURE — 76775 US EXAM ABDO BACK WALL LIM: CPT

## 2023-03-03 NOTE — HISTORY OF PRESENT ILLNESS
[FreeTextEntry1] : Very pleasant 82-year-old gentleman who presents for follow-up of elevated PSA found on recent blood test from 6 months ago to 5.4.  This was rechecked a few weeks later and was found to be 2.2.  Reports that he had a prostate biopsy with Dr. Valderrama many years ago for an elevated PSA.  This was benign.  No suprapubic pain. No dysuria.  No increased urinary frequency or urgency.  He underwent a surveillance renal ultrasound today for a complex left renal cyst.  The cyst was not visualized on ultrasound at this time.

## 2023-03-03 NOTE — ASSESSMENT
[FreeTextEntry1] : Very pleasant 82-year-old gentleman who presents for follow-up of complex left renal cyst, elevated PSA\par -PSA most recently 2.2\par -Ultrasound images reviewed with the patient today demonstrating no kidney stones, hydronephrosis, nor renal masses.  It also did not demonstrate a complex renal cyst previously visualized\par -Follow-up in 1 year with repeat renal ultrasound

## 2023-06-28 NOTE — ASU PREOP CHECKLIST - ASSESSMENT, HISTORY & PHYSICAL COMPLETED AND ON MEDICAL RECORD
Quality 226: Preventive Care And Screening: Tobacco Use: Screening And Cessation Intervention: Patient screened for tobacco use and is an ex/non-smoker Quality 130: Documentation Of Current Medications In The Medical Record: Current Medications Documented Detail Level: Detailed done

## 2023-10-09 NOTE — ED PROVIDER NOTE - ATTESTATION, MLM
Medication:   Requested Prescriptions     Pending Prescriptions Disp Refills    amphetamine-dextroamphetamine (ADDERALL, 20MG,) 20 MG tablet 60 tablet 0     Sig: Take 1 tablet by mouth 2 times daily for 30 days. Max Daily Amount: 40 mg     Last Filled:  9/12/2023    Last appt: 8/31/2023   Next appt: Visit date not found    Last OARRS:       5/1/2023     3:48 PM   RX Monitoring   Periodic Controlled Substance Monitoring Possible medication side effects, risk of tolerance/dependence & alternative treatments discussed. ;Potential drug abuse or diversion identified, see note documentation. ;Assessed functional status. I have reviewed and confirmed nurses' notes for patient's medications, allergies, medical history, and surgical history.

## 2023-10-17 NOTE — PATIENT PROFILE ADULT - FUNCTIONAL SCREEN CURRENT LEVEL: SWALLOWING (IF SCORE 2 OR MORE FOR ANY ITEM, CONSULT REHAB SERVICES), MLM)
0 = swallows foods/liquids without difficulty Double O-Z Plasty Text: The defect edges were debeveled with a #15 scalpel blade.  Given the location of the defect, shape of the defect and the proximity to free margins a Double O-Z plasty (double transposition flap) was deemed most appropriate.  Using a sterile surgical marker, the appropriate transposition flaps were drawn incorporating the defect and placing the expected incisions within the relaxed skin tension lines where possible. The area thus outlined was incised deep to adipose tissue with a #15 scalpel blade.  The skin margins were undermined to an appropriate distance in all directions utilizing iris scissors.  Hemostasis was achieved with electrocautery.  The flaps were then transposed into place, one clockwise and the other counterclockwise, and anchored with interrupted buried subcutaneous sutures.

## 2023-11-22 ENCOUNTER — APPOINTMENT (OUTPATIENT)
Dept: UROLOGY | Facility: CLINIC | Age: 83
End: 2023-11-22
Payer: MEDICARE

## 2023-11-22 VITALS
DIASTOLIC BLOOD PRESSURE: 74 MMHG | SYSTOLIC BLOOD PRESSURE: 124 MMHG | BODY MASS INDEX: 25.33 KG/M2 | WEIGHT: 187 LBS | HEIGHT: 72 IN | TEMPERATURE: 98 F | HEART RATE: 76 BPM | OXYGEN SATURATION: 98 %

## 2023-11-22 PROCEDURE — 99214 OFFICE O/P EST MOD 30 MIN: CPT

## 2023-11-24 LAB
ANION GAP SERPL CALC-SCNC: 12 MMOL/L
BUN SERPL-MCNC: 28 MG/DL
CALCIUM SERPL-MCNC: 10.2 MG/DL
CHLORIDE SERPL-SCNC: 105 MMOL/L
CO2 SERPL-SCNC: 26 MMOL/L
CREAT SERPL-MCNC: 1.7 MG/DL
EGFR: 40 ML/MIN/1.73M2
ESTIMATED AVERAGE GLUCOSE: 123 MG/DL
GLUCOSE SERPL-MCNC: 93 MG/DL
HBA1C MFR BLD HPLC: 5.9 %
POTASSIUM SERPL-SCNC: 4.5 MMOL/L
SODIUM SERPL-SCNC: 142 MMOL/L

## 2024-03-05 ENCOUNTER — APPOINTMENT (OUTPATIENT)
Dept: UROLOGY | Facility: CLINIC | Age: 84
End: 2024-03-05
Payer: MEDICARE

## 2024-03-05 VITALS
SYSTOLIC BLOOD PRESSURE: 118 MMHG | HEIGHT: 72 IN | DIASTOLIC BLOOD PRESSURE: 56 MMHG | OXYGEN SATURATION: 95 % | TEMPERATURE: 98 F | WEIGHT: 184 LBS | BODY MASS INDEX: 24.92 KG/M2 | HEART RATE: 70 BPM

## 2024-03-05 DIAGNOSIS — N28.1 CYST OF KIDNEY, ACQUIRED: ICD-10-CM

## 2024-03-05 DIAGNOSIS — R97.20 ELEVATED PROSTATE, SPECIFIC ANTIGEN [PSA]: ICD-10-CM

## 2024-03-05 DIAGNOSIS — R33.9 RETENTION OF URINE, UNSPECIFIED: ICD-10-CM

## 2024-03-05 DIAGNOSIS — N17.9 ACUTE KIDNEY FAILURE, UNSPECIFIED: ICD-10-CM

## 2024-03-05 PROCEDURE — 99214 OFFICE O/P EST MOD 30 MIN: CPT

## 2024-03-05 PROCEDURE — G2211 COMPLEX E/M VISIT ADD ON: CPT

## 2024-03-05 NOTE — ASSESSMENT
[FreeTextEntry1] : Very pleasant 83-year-old gentleman who presents for follow-up of BPH, incomplete bladder emptying, history of acute kidney injury, history of elevated PSA -PSA most recently 2.2; repeat in 6 months -Renal ultrasound images reviewed with the patient today demonstrating no kidney stones, hydronephrosis, renal masses -Creatinine 1.7; repeat in 6 months.  Patient continues to follow-up with his primary care physician regarding elevated creatinine -A1c 5.9% -Stop tamsulosin given excellent urinary symptoms off of the medication  Patient is being seen today for evaluation and management of a chronic and longitudinal ongoing condition and I am of the primary treating physician

## 2024-03-05 NOTE — PHYSICAL EXAM
[Well Groomed] : well groomed [Normal Appearance] : normal appearance [Edema] : no peripheral edema [General Appearance - In No Acute Distress] : no acute distress [Respiration, Rhythm And Depth] : normal respiratory rhythm and effort [Exaggerated Use Of Accessory Muscles For Inspiration] : no accessory muscle use [Abdomen Soft] : soft [Costovertebral Angle Tenderness] : no ~M costovertebral angle tenderness [Abdomen Tenderness] : non-tender [Urinary Bladder Findings] : the bladder was normal on palpation [] : no rash [Normal Station and Gait] : the gait and station were normal for the patient's age [No Focal Deficits] : no focal deficits [Oriented To Time, Place, And Person] : oriented to person, place, and time [Mood] : the mood was normal [Affect] : the affect was normal [No Palpable Adenopathy] : no palpable adenopathy

## 2024-03-05 NOTE — HISTORY OF PRESENT ILLNESS
[FreeTextEntry1] : Very pleasant 83-year-old gentleman who presents for follow-up of BPH, incomplete bladder emptying, JJ, renal cyst.  He underwent a renal ultrasound today which demonstrated no kidney stones, hydronephrosis, renal masses.  He reports that he stopped taking tamsulosin after his last visit and reports no problems urinating.  He reports a strong urinary stream.  He is happy with his urinary symptoms.  No other complaints.

## 2024-10-04 ENCOUNTER — APPOINTMENT (OUTPATIENT)
Dept: UROLOGY | Facility: CLINIC | Age: 84
End: 2024-10-04
Payer: MEDICARE

## 2024-10-04 VITALS
DIASTOLIC BLOOD PRESSURE: 70 MMHG | SYSTOLIC BLOOD PRESSURE: 118 MMHG | WEIGHT: 182 LBS | TEMPERATURE: 97.4 F | HEART RATE: 75 BPM | BODY MASS INDEX: 24.65 KG/M2 | HEIGHT: 72 IN | OXYGEN SATURATION: 98 %

## 2024-10-04 DIAGNOSIS — N13.8 BENIGN PROSTATIC HYPERPLASIA WITH LOWER URINARY TRACT SYMPMS: ICD-10-CM

## 2024-10-04 DIAGNOSIS — N40.1 BENIGN PROSTATIC HYPERPLASIA WITH LOWER URINARY TRACT SYMPMS: ICD-10-CM

## 2024-10-04 DIAGNOSIS — N28.1 CYST OF KIDNEY, ACQUIRED: ICD-10-CM

## 2024-10-04 PROCEDURE — G2211 COMPLEX E/M VISIT ADD ON: CPT

## 2024-10-04 PROCEDURE — 99213 OFFICE O/P EST LOW 20 MIN: CPT

## 2024-10-04 NOTE — HISTORY OF PRESENT ILLNESS
[FreeTextEntry1] : Very pleasant 84-year-old gentleman who presents for follow-up of BPH, incomplete bladder emptying, JJ, complex renal cyst.  He underwent a renal ultrasound 6 months ago which demonstrated no kidney stones, hydronephrosis, renal masses.  He continues to report a strong urinary stream after stopping tamsulosin.  He is happy with his urinary symptoms.  No other complaints.

## 2024-10-04 NOTE — ASSESSMENT
[FreeTextEntry1] : Very pleasant 84-year-old gentleman who presents for follow-up of BPH, history of urinary retention, complex renal cyst -Ultrasound from 6 months ago demonstrates no kidney stones, hydronephrosis, nor concerning renal masses -PSA today -BMP to check creatinine today -A1c -Remain off tamsulosin given improvement in his urinary symptoms off of the medication -Follow-up in 1 year if labs are stable  Patient is being seen today for evaluation and management of a chronic and longitudinal ongoing condition and I am of the primary treating physician

## 2024-10-07 LAB
ANION GAP SERPL CALC-SCNC: 16 MMOL/L
BUN SERPL-MCNC: 21 MG/DL
CALCIUM SERPL-MCNC: 10.1 MG/DL
CHLORIDE SERPL-SCNC: 106 MMOL/L
CO2 SERPL-SCNC: 19 MMOL/L
CREAT SERPL-MCNC: 1.37 MG/DL
EGFR: 51 ML/MIN/1.73M2
ESTIMATED AVERAGE GLUCOSE: 114 MG/DL
GLUCOSE SERPL-MCNC: 94 MG/DL
HBA1C MFR BLD HPLC: 5.6 %
POTASSIUM SERPL-SCNC: 4.8 MMOL/L
PSA SERPL-MCNC: 3.16 NG/ML
SODIUM SERPL-SCNC: 140 MMOL/L

## 2025-03-20 ENCOUNTER — APPOINTMENT (OUTPATIENT)
Dept: PULMONOLOGY | Facility: CLINIC | Age: 85
End: 2025-03-20

## 2025-04-11 ENCOUNTER — APPOINTMENT (OUTPATIENT)
Dept: PULMONOLOGY | Facility: CLINIC | Age: 85
End: 2025-04-11
Payer: MEDICARE

## 2025-04-11 VITALS
OXYGEN SATURATION: 97 % | WEIGHT: 183 LBS | DIASTOLIC BLOOD PRESSURE: 68 MMHG | HEART RATE: 51 BPM | BODY MASS INDEX: 24.79 KG/M2 | HEIGHT: 72 IN | RESPIRATION RATE: 16 BRPM | TEMPERATURE: 97.7 F | SYSTOLIC BLOOD PRESSURE: 136 MMHG

## 2025-04-11 DIAGNOSIS — R06.09 OTHER FORMS OF DYSPNEA: ICD-10-CM

## 2025-04-11 DIAGNOSIS — G47.19 OTHER HYPERSOMNIA: ICD-10-CM

## 2025-04-11 DIAGNOSIS — R06.83 SNORING: ICD-10-CM

## 2025-04-11 LAB
AGE OVER 50 YEARS OLD?: ABNORMAL
BMI MORE THAN 35 KG/M2?: NORMAL
DO YOU HAVE OR ARE YOU BEING TREATED FOR HIGH BLOOD PRESSURE?: ABNORMAL
DO YOU OFTEN FEEL TIRED, FATIGUED, OR SLEEPY DURING DAYTIME?: ABNORMAL
DO YOU SNORE LOUDLY (LOUDER THAN TALKING OR LOUD ENOUGH TO BE HEARD THROUGH CLOSED DOORS)?: ABNORMAL
GENDER MALE?: ABNORMAL
HAS ANYONE OBSERVED YOU STOP BREATHING DURING YOUR SLEEP?: NORMAL
NECK CIRCUMFERENCE GREATER THAN 40 CM?: NORMAL
STOP-BANG QUESTIONNAIRE: OSA TOTAL SCORE: 5
STOP-BANG QUESTIONNAIRE: RISK OF OSA: ABNORMAL

## 2025-04-11 PROCEDURE — 99204 OFFICE O/P NEW MOD 45 MIN: CPT

## 2025-04-11 RX ORDER — AMLODIPINE BESYLATE 5 MG/1
TABLET ORAL
Refills: 0 | Status: ACTIVE | COMMUNITY

## 2025-04-11 RX ORDER — OMEPRAZOLE 40 MG/1
CAPSULE, DELAYED RELEASE ORAL
Refills: 0 | Status: ACTIVE | COMMUNITY

## 2025-04-15 ENCOUNTER — APPOINTMENT (OUTPATIENT)
Dept: PULMONOLOGY | Facility: CLINIC | Age: 85
End: 2025-04-15
Payer: MEDICARE

## 2025-04-15 VITALS
WEIGHT: 182 LBS | TEMPERATURE: 97.6 F | OXYGEN SATURATION: 96 % | BODY MASS INDEX: 24.68 KG/M2 | RESPIRATION RATE: 16 BRPM | SYSTOLIC BLOOD PRESSURE: 126 MMHG | HEART RATE: 74 BPM | DIASTOLIC BLOOD PRESSURE: 61 MMHG

## 2025-04-15 PROCEDURE — 94010 BREATHING CAPACITY TEST: CPT

## 2025-04-15 PROCEDURE — 94726 PLETHYSMOGRAPHY LUNG VOLUMES: CPT

## 2025-04-15 PROCEDURE — 94729 DIFFUSING CAPACITY: CPT

## 2025-05-23 DIAGNOSIS — G47.33 OBSTRUCTIVE SLEEP APNEA (ADULT) (PEDIATRIC): ICD-10-CM

## 2025-06-09 ENCOUNTER — NON-APPOINTMENT (OUTPATIENT)
Age: 85
End: 2025-06-09